# Patient Record
Sex: FEMALE | Race: WHITE | NOT HISPANIC OR LATINO
[De-identification: names, ages, dates, MRNs, and addresses within clinical notes are randomized per-mention and may not be internally consistent; named-entity substitution may affect disease eponyms.]

---

## 2021-03-05 PROBLEM — Z00.00 ENCOUNTER FOR PREVENTIVE HEALTH EXAMINATION: Status: ACTIVE | Noted: 2021-03-05

## 2021-03-30 PROBLEM — E78.5 HYPERLIPIDEMIA, UNSPECIFIED HYPERLIPIDEMIA TYPE: Status: ACTIVE | Noted: 2021-03-30

## 2021-03-30 PROBLEM — Z82.49 FAMILY HISTORY OF CORONARY ARTERY DISEASE: Status: ACTIVE | Noted: 2021-03-30

## 2021-03-30 PROBLEM — Z87.891 FORMER SMOKER: Status: ACTIVE | Noted: 2021-03-30

## 2021-03-30 PROBLEM — J45.909 ASTHMA, UNSPECIFIED ASTHMA SEVERITY, UNSPECIFIED WHETHER COMPLICATED, UNSPECIFIED WHETHER PERSISTENT: Status: ACTIVE | Noted: 2021-03-30

## 2021-03-30 PROBLEM — J44.9 CHRONIC OBSTRUCTIVE PULMONARY DISEASE, UNSPECIFIED COPD TYPE: Noted: 2021-03-30

## 2021-03-30 RX ORDER — DILTIAZEM HYDROCHLORIDE 300 MG/1
300 CAPSULE, EXTENDED RELEASE ORAL DAILY
Refills: 0 | Status: ACTIVE | COMMUNITY
Start: 2021-03-30

## 2021-03-30 RX ORDER — APIXABAN 5 MG/1
5 TABLET, FILM COATED ORAL
Qty: 60 | Refills: 1 | Status: ACTIVE | COMMUNITY
Start: 2021-03-30

## 2021-04-01 ENCOUNTER — APPOINTMENT (OUTPATIENT)
Dept: ELECTROPHYSIOLOGY | Facility: CLINIC | Age: 66
End: 2021-04-01
Payer: MEDICARE

## 2021-04-01 VITALS
WEIGHT: 247 LBS | TEMPERATURE: 96.7 F | SYSTOLIC BLOOD PRESSURE: 160 MMHG | BODY MASS INDEX: 35.36 KG/M2 | HEART RATE: 90 BPM | OXYGEN SATURATION: 98 % | DIASTOLIC BLOOD PRESSURE: 92 MMHG | HEIGHT: 70 IN

## 2021-04-01 DIAGNOSIS — J44.9 CHRONIC OBSTRUCTIVE PULMONARY DISEASE, UNSPECIFIED: ICD-10-CM

## 2021-04-01 DIAGNOSIS — Z87.891 PERSONAL HISTORY OF NICOTINE DEPENDENCE: ICD-10-CM

## 2021-04-01 DIAGNOSIS — J45.909 UNSPECIFIED ASTHMA, UNCOMPLICATED: ICD-10-CM

## 2021-04-01 DIAGNOSIS — E78.5 HYPERLIPIDEMIA, UNSPECIFIED: ICD-10-CM

## 2021-04-01 DIAGNOSIS — R06.83 SNORING: ICD-10-CM

## 2021-04-01 DIAGNOSIS — Z82.49 FAMILY HISTORY OF ISCHEMIC HEART DISEASE AND OTHER DISEASES OF THE CIRCULATORY SYSTEM: ICD-10-CM

## 2021-04-01 PROCEDURE — 99205 OFFICE O/P NEW HI 60 MIN: CPT | Mod: 25

## 2021-04-01 PROCEDURE — 93000 ELECTROCARDIOGRAM COMPLETE: CPT

## 2021-04-01 NOTE — REASON FOR VISIT
[Consultation] : a consultation regarding [Atrial Fibrillation] : atrial fibrillation [FreeTextEntry1] : Cardiologist: Dr. Damon

## 2021-04-01 NOTE — PHYSICAL EXAM
[General Appearance - Well Developed] : well developed [General Appearance - Well Nourished] : well nourished [FreeTextEntry1] : Obesity [Respiration, Rhythm And Depth] : normal respiratory rhythm and effort [Exaggerated Use Of Accessory Muscles For Inspiration] : no accessory muscle use [Abnormal Walk] : normal gait [Skin Color & Pigmentation] : normal skin color and pigmentation [Skin Turgor] : normal skin turgor [] : no rash [Oriented To Time, Place, And Person] : oriented to person, place, and time [Impaired Insight] : insight and judgment were intact [No Anxiety] : not feeling anxious

## 2021-04-01 NOTE — HISTORY OF PRESENT ILLNESS
[FreeTextEntry1] : KEMAR AUSTIN is a 65 year old female with hypertension, hyperlipidemia, and asthma who is referred by Dr. Damon in consultation for persistent atrial fibrillation.\par \par To summarize her history, she was first diagnosed with atrial fibrillation at age 54. She developed this in the setting of her mother being sick with lung cancer. She went to see her PCP and her AF had lasted for about 1 week. She saw a PCP who diagnosed her with AF on ECG. She was given Atenolol and she converted to sinus rhythm spontaneously. She was not started on anticoagulation. From 8300-2949 she reported that she was under significant stress. She had recurrence of AF in 2018 and she noted having palpitations and irregularity. At the time she was also having dyspnea on exertion. She was placed on warfarin and she underwent TRACY/cardioversion with restoration of normal rhythm. She maintained normal rhythm for about 10 months. It was around this time (3/2019) that her job had let her go to prison. Due to losing her job, she did not have healthcare coverage. She did not see any physician until 10/2020 and he recommended evaluation by cardiology. She was started on anticoagulation with Apixaban and so she feels that she was found to have AF. She saw Dr. Damon in 3/2021 and AF was confirmed.\par \par Today, she states that she does not have any dizziness, lightheadedness, palpitations or dyspnea. She reports that she has noticed less energy now as compared to when she did not have AF. She denies having any syncope, peripheral edema or PND. She endorses snoring.

## 2021-04-01 NOTE — DISCUSSION/SUMMARY
[FreeTextEntry1] : KEMAR AUSTIN is a 65 year old female with hypertension, hyperlipidemia, and asthma who is referred by Dr. Damon in consultation for persistent atrial fibrillation.\par \par We had a lengthy discussion about the diagnosis of atrial fibrillation, its prognosis and options for management (rate vs rhythm control), and the advantages and disadvantages of each. We also discussed the options for rhythm control with either medications, catheter ablation, or both. Since she has symptoms of fatigue despite rate control of AF and also some mitral regurgitation, I feel she would do best with maintenance of regular rhythm.\par \par The risks of catheter ablation were described in detail and include, but are not limited to: pain, bleeding, infection, vascular injury, cardiac perforation and tamponade with potential for requiring open heart surgery, complete heart block requiring permanent pacing, phrenic nerve injury and diaphragmatic paralysis, atrioesophageal fistula, pulmonary vein stenosis, radiation-induced injury, death, heart attack, or stroke, of which the overall rates of occurrence range from 0.1-4%. The patient expressed understanding and was provided the opportunity to ask questions, of which all were answered to the patient's satisfaction.\par \par At this juncture, the patient would like to pursue catheter ablation.\par \par In regards to anticoagulation, their CHADS2-VASc score is 3 (Age, HTN, Female) and so should remain on Apixaban.\par \par Lastly, I expressed the importance of risk factor modification for the long term maintenance of sinus rhythm including appropriate blood pressure control, diabetes control and weight management. She expressed understanding.\par \par Recommendations:\par - Sleep Study to evaluate for ADDY\par - Catheter ablation of AF\par - TRACY before ablation to rule out LA/PEPE thrombus\par - Continue Apixaban uninterrupted, hold morning of procedure\par - Needs better optimization of BP medications (patient to check BP at home)\par - Continue rate control for now

## 2021-04-20 ENCOUNTER — APPOINTMENT (OUTPATIENT)
Dept: DISASTER EMERGENCY | Facility: CLINIC | Age: 66
End: 2021-04-20

## 2021-04-20 DIAGNOSIS — Z01.818 ENCOUNTER FOR OTHER PREPROCEDURAL EXAMINATION: ICD-10-CM

## 2021-04-21 ENCOUNTER — NON-APPOINTMENT (OUTPATIENT)
Age: 66
End: 2021-04-21

## 2021-04-21 LAB — SARS-COV-2 N GENE NPH QL NAA+PROBE: NOT DETECTED

## 2021-04-22 ENCOUNTER — FORM ENCOUNTER (OUTPATIENT)
Age: 66
End: 2021-04-22

## 2021-04-22 VITALS
HEIGHT: 70 IN | DIASTOLIC BLOOD PRESSURE: 89 MMHG | OXYGEN SATURATION: 97 % | TEMPERATURE: 98 F | WEIGHT: 246.04 LBS | RESPIRATION RATE: 18 BRPM | SYSTOLIC BLOOD PRESSURE: 193 MMHG | HEART RATE: 108 BPM

## 2021-04-22 NOTE — H&P PST ADULT - HISTORY OF PRESENT ILLNESS
65 year old female with hypertension, hyperlipidemia, asthma and persistent atrial fibrillation. To summarize her history, she was first diagnosed with atrial fibrillation at age 54. She developed this in the setting of her mother being sick with lung cancer. She went to see her PCP and her AF had lasted for about 1 week. She saw a PCP who diagnosed her with AF on ECG. She was given Atenolol and she converted to sinus rhythm spontaneously. She was not started on anticoagulation. From 8595-9110 she reported that she was under significant stress. She had recurrence of AF in 2018 and she noted having palpitations and irregularity. At the time she was also having dyspnea on exertion. She was placed on warfarin and she underwent TRACY/cardioversion with restoration of normal rhythm. She maintained normal rhythm for about 10 months. It was around this time (3/2019) that her job had let her go to senior living. Due to losing her job, she did not have healthcare coverage. She did not see any physician until 10/2020 and he recommended evaluation by cardiology. She was started on anticoagulation with Apixaban and so she feels that she was found to have AF. She saw Dr. Damon in 3/2021 and AF was confirmed. She states that she does not have any dizziness, lightheadedness, palpitations or dyspnea. She reports that she has noticed less energy now as compared to when she did not have AF. She denies having any syncope, peripheral edema or PND. She presents electively for AF ablation with preceding TRACY.     Cardiology Summary  EK2021: AF rate controlled.  Echo: 03/10/2021 TTE: LVEF: 55%. Mildly dilated LA (LAd 4.71 cm, SUREKHA 52.3 mL/m2). Moderate-severe eccentric MR. Mild TR. RVSP 26.3 mmHg.  2018 TRACY: Normal LVEF. Mildly dilated LA. Normal RA. Mild MR, TR.  2018 TTE: LVEF: 64%. Mildly dilated LA (LAd 4.5 cm, SUREKHA 45.9 mL/m2). Mild MR, TR, PI.

## 2021-04-22 NOTE — H&P PST ADULT - COMMENTS
Denies fevers, chills, CP, palp, abdominal pain, N/V/D, hematuria, bloody or dark colored stools    + SOB/RODRIGUEZ + fatigue

## 2021-04-22 NOTE — H&P PST ADULT - NSICDXPASTMEDICALHX_GEN_ALL_CORE_FT
PAST MEDICAL HISTORY:  Asthma     [facility]    HLD (hyperlipidemia)     HTN (hypertension)

## 2021-04-22 NOTE — H&P PST ADULT - ATTENDING COMMENTS
65 year old woman with HTN, HL, asthma and symptomatic persistent atrial fibrillation (on Apixaban, TRACY/CV in 2018) despite rate control who now presents for AF ablation for rhythm control. All risks, benefits and alternatives discussed at length. She agrees to proceed with ablation. TRACY will be performed beforehand to rule out LA/PEPE thrombus.    Ceasar Huitron MD  Clinical Cardiac Electrophysiology

## 2021-04-22 NOTE — H&P PST ADULT - ASSESSMENT
65 year old female with hypertension, hyperlipidemia, asthma and persistent atrial fibrillation s/p TRACY/cardioversion in 2018 with restoration of normal rhythm. She maintained normal rhythm for about 10 months, however, she lost her healthcare coverage and was lost in followup and did not see any physician until 10/2020. She presents electively for AF ablation with preceding TRACY.     Plan:  Consent w/ Attending  Stat labs & ecg  Covid negative 4/20/2021  NPO ***  Last Xarelto dose *** 65 year old female with hypertension, hyperlipidemia, asthma and persistent atrial fibrillation s/p TRACY/cardioversion in 2018 with restoration of normal rhythm. She maintained normal rhythm for about 10 months, however, she lost her healthcare coverage and was lost in followup and did not see any physician until 10/2020. She presents electively for AF ablation with preceding TRACY.     Plan:  Consent w/ Attending  Stat labs & ecg  Covid negative 4/20/2021  NPO > 10 hours  Last Eliquis dose yesterday evening

## 2021-04-23 ENCOUNTER — TRANSCRIPTION ENCOUNTER (OUTPATIENT)
Age: 66
End: 2021-04-23

## 2021-04-23 ENCOUNTER — INPATIENT (INPATIENT)
Facility: HOSPITAL | Age: 66
LOS: 0 days | Discharge: HOME | DRG: 274 | End: 2021-04-24
Attending: INTERNAL MEDICINE | Admitting: INTERNAL MEDICINE
Payer: MEDICARE

## 2021-04-23 DIAGNOSIS — I48.19 OTHER PERSISTENT ATRIAL FIBRILLATION: ICD-10-CM

## 2021-04-23 DIAGNOSIS — I48.0 PAROXYSMAL ATRIAL FIBRILLATION: ICD-10-CM

## 2021-04-23 DIAGNOSIS — Z90.89 ACQUIRED ABSENCE OF OTHER ORGANS: Chronic | ICD-10-CM

## 2021-04-23 LAB
ABO RH CONFIRMATION: SIGNIFICANT CHANGE UP
ANION GAP SERPL CALC-SCNC: 11 MMOL/L — SIGNIFICANT CHANGE UP (ref 5–17)
APTT BLD: 33.8 SEC — SIGNIFICANT CHANGE UP (ref 27.5–35.5)
BLD GP AB SCN SERPL QL: SIGNIFICANT CHANGE UP
BUN SERPL-MCNC: 14 MG/DL — SIGNIFICANT CHANGE UP (ref 8–20)
CALCIUM SERPL-MCNC: 7.4 MG/DL — LOW (ref 8.6–10.2)
CHLORIDE SERPL-SCNC: 110 MMOL/L — HIGH (ref 98–107)
CO2 SERPL-SCNC: 22 MMOL/L — SIGNIFICANT CHANGE UP (ref 22–29)
CREAT SERPL-MCNC: 0.5 MG/DL — SIGNIFICANT CHANGE UP (ref 0.5–1.3)
GLUCOSE SERPL-MCNC: 109 MG/DL — HIGH (ref 70–99)
HCT VFR BLD CALC: 44.6 % — SIGNIFICANT CHANGE UP (ref 34.5–45)
HGB BLD-MCNC: 14.7 G/DL — SIGNIFICANT CHANGE UP (ref 11.5–15.5)
INR BLD: 1.06 RATIO — SIGNIFICANT CHANGE UP (ref 0.88–1.16)
MAGNESIUM SERPL-MCNC: 1.5 MG/DL — LOW (ref 1.6–2.6)
MCHC RBC-ENTMCNC: 29.9 PG — SIGNIFICANT CHANGE UP (ref 27–34)
MCHC RBC-ENTMCNC: 33 GM/DL — SIGNIFICANT CHANGE UP (ref 32–36)
MCV RBC AUTO: 90.7 FL — SIGNIFICANT CHANGE UP (ref 80–100)
PLATELET # BLD AUTO: 343 K/UL — SIGNIFICANT CHANGE UP (ref 150–400)
POTASSIUM SERPL-MCNC: 3.5 MMOL/L — SIGNIFICANT CHANGE UP (ref 3.5–5.3)
POTASSIUM SERPL-SCNC: 3.5 MMOL/L — SIGNIFICANT CHANGE UP (ref 3.5–5.3)
PROTHROM AB SERPL-ACNC: 12.3 SEC — SIGNIFICANT CHANGE UP (ref 10.6–13.6)
RBC # BLD: 4.92 M/UL — SIGNIFICANT CHANGE UP (ref 3.8–5.2)
RBC # FLD: 13.7 % — SIGNIFICANT CHANGE UP (ref 10.3–14.5)
SODIUM SERPL-SCNC: 142 MMOL/L — SIGNIFICANT CHANGE UP (ref 135–145)
WBC # BLD: 11.03 K/UL — HIGH (ref 3.8–10.5)
WBC # FLD AUTO: 11.03 K/UL — HIGH (ref 3.8–10.5)

## 2021-04-23 PROCEDURE — 93312 ECHO TRANSESOPHAGEAL: CPT | Mod: 26

## 2021-04-23 PROCEDURE — 93325 DOPPLER ECHO COLOR FLOW MAPG: CPT | Mod: 26

## 2021-04-23 PROCEDURE — 93320 DOPPLER ECHO COMPLETE: CPT | Mod: 26

## 2021-04-23 PROCEDURE — 76376 3D RENDER W/INTRP POSTPROCES: CPT | Mod: 26

## 2021-04-23 PROCEDURE — 93010 ELECTROCARDIOGRAM REPORT: CPT

## 2021-04-23 RX ORDER — FENTANYL CITRATE 50 UG/ML
25 INJECTION INTRAVENOUS ONCE
Refills: 0 | Status: DISCONTINUED | OUTPATIENT
Start: 2021-04-23 | End: 2021-04-23

## 2021-04-23 RX ORDER — ATORVASTATIN CALCIUM 80 MG/1
20 TABLET, FILM COATED ORAL AT BEDTIME
Refills: 0 | Status: DISCONTINUED | OUTPATIENT
Start: 2021-04-23 | End: 2021-04-24

## 2021-04-23 RX ORDER — ALBUTEROL 90 UG/1
2 AEROSOL, METERED ORAL EVERY 6 HOURS
Refills: 0 | Status: DISCONTINUED | OUTPATIENT
Start: 2021-04-23 | End: 2021-04-24

## 2021-04-23 RX ORDER — PANTOPRAZOLE SODIUM 20 MG/1
40 TABLET, DELAYED RELEASE ORAL
Refills: 0 | Status: DISCONTINUED | OUTPATIENT
Start: 2021-04-23 | End: 2021-04-24

## 2021-04-23 RX ORDER — SUCRALFATE 1 G
1 TABLET ORAL
Refills: 0 | Status: DISCONTINUED | OUTPATIENT
Start: 2021-04-23 | End: 2021-04-24

## 2021-04-23 RX ORDER — FUROSEMIDE 40 MG
20 TABLET ORAL DAILY
Refills: 0 | Status: DISCONTINUED | OUTPATIENT
Start: 2021-04-24 | End: 2021-04-24

## 2021-04-23 RX ORDER — ALBUTEROL 90 UG/1
2 AEROSOL, METERED ORAL
Qty: 0 | Refills: 0 | DISCHARGE

## 2021-04-23 RX ORDER — METOPROLOL TARTRATE 50 MG
100 TABLET ORAL DAILY
Refills: 0 | Status: DISCONTINUED | OUTPATIENT
Start: 2021-04-23 | End: 2021-04-24

## 2021-04-23 RX ORDER — APIXABAN 2.5 MG/1
1 TABLET, FILM COATED ORAL
Qty: 0 | Refills: 0 | DISCHARGE

## 2021-04-23 RX ORDER — BUDESONIDE AND FORMOTEROL FUMARATE DIHYDRATE 160; 4.5 UG/1; UG/1
2 AEROSOL RESPIRATORY (INHALATION)
Refills: 0 | Status: DISCONTINUED | OUTPATIENT
Start: 2021-04-23 | End: 2021-04-24

## 2021-04-23 RX ORDER — BUDESONIDE AND FORMOTEROL FUMARATE DIHYDRATE 160; 4.5 UG/1; UG/1
2 AEROSOL RESPIRATORY (INHALATION)
Qty: 0 | Refills: 0 | DISCHARGE

## 2021-04-23 RX ORDER — DILTIAZEM HCL 120 MG
300 CAPSULE, EXT RELEASE 24 HR ORAL DAILY
Refills: 0 | Status: DISCONTINUED | OUTPATIENT
Start: 2021-04-24 | End: 2021-04-24

## 2021-04-23 RX ORDER — BENZOCAINE AND MENTHOL 5; 1 G/100ML; G/100ML
1 LIQUID ORAL
Refills: 0 | Status: DISCONTINUED | OUTPATIENT
Start: 2021-04-23 | End: 2021-04-24

## 2021-04-23 RX ORDER — APIXABAN 2.5 MG/1
5 TABLET, FILM COATED ORAL
Refills: 0 | Status: DISCONTINUED | OUTPATIENT
Start: 2021-04-23 | End: 2021-04-24

## 2021-04-23 RX ORDER — MAGNESIUM SULFATE 500 MG/ML
2 VIAL (ML) INJECTION ONCE
Refills: 0 | Status: COMPLETED | OUTPATIENT
Start: 2021-04-23 | End: 2021-04-23

## 2021-04-23 RX ORDER — ALPRAZOLAM 0.25 MG
0.25 TABLET ORAL EVERY 8 HOURS
Refills: 0 | Status: DISCONTINUED | OUTPATIENT
Start: 2021-04-23 | End: 2021-04-24

## 2021-04-23 RX ORDER — DILTIAZEM HCL 120 MG
60 CAPSULE, EXT RELEASE 24 HR ORAL DAILY
Refills: 0 | Status: DISCONTINUED | OUTPATIENT
Start: 2021-04-23 | End: 2021-04-24

## 2021-04-23 RX ORDER — KETOROLAC TROMETHAMINE 30 MG/ML
15 SYRINGE (ML) INJECTION ONCE
Refills: 0 | Status: DISCONTINUED | OUTPATIENT
Start: 2021-04-23 | End: 2021-04-23

## 2021-04-23 RX ORDER — FUROSEMIDE 40 MG
20 TABLET ORAL ONCE
Refills: 0 | Status: COMPLETED | OUTPATIENT
Start: 2021-04-23 | End: 2021-04-23

## 2021-04-23 RX ORDER — DILTIAZEM HCL 120 MG
1 CAPSULE, EXT RELEASE 24 HR ORAL
Qty: 0 | Refills: 0 | DISCHARGE

## 2021-04-23 RX ORDER — ATORVASTATIN CALCIUM 80 MG/1
1 TABLET, FILM COATED ORAL
Qty: 0 | Refills: 0 | DISCHARGE

## 2021-04-23 RX ORDER — ACETAMINOPHEN 500 MG
650 TABLET ORAL EVERY 6 HOURS
Refills: 0 | Status: DISCONTINUED | OUTPATIENT
Start: 2021-04-23 | End: 2021-04-24

## 2021-04-23 RX ADMIN — APIXABAN 5 MILLIGRAM(S): 2.5 TABLET, FILM COATED ORAL at 18:32

## 2021-04-23 RX ADMIN — FENTANYL CITRATE 25 MICROGRAM(S): 50 INJECTION INTRAVENOUS at 15:45

## 2021-04-23 RX ADMIN — Medication 650 MILLIGRAM(S): at 22:15

## 2021-04-23 RX ADMIN — Medication 650 MILLIGRAM(S): at 21:15

## 2021-04-23 RX ADMIN — Medication 100 MILLIGRAM(S): at 18:35

## 2021-04-23 RX ADMIN — FENTANYL CITRATE 25 MICROGRAM(S): 50 INJECTION INTRAVENOUS at 15:05

## 2021-04-23 RX ADMIN — Medication 15 MILLIGRAM(S): at 17:30

## 2021-04-23 RX ADMIN — ATORVASTATIN CALCIUM 20 MILLIGRAM(S): 80 TABLET, FILM COATED ORAL at 21:12

## 2021-04-23 RX ADMIN — FENTANYL CITRATE 25 MICROGRAM(S): 50 INJECTION INTRAVENOUS at 14:51

## 2021-04-23 RX ADMIN — Medication 1 GRAM(S): at 18:35

## 2021-04-23 RX ADMIN — BENZOCAINE AND MENTHOL 1 LOZENGE: 5; 1 LIQUID ORAL at 21:13

## 2021-04-23 RX ADMIN — Medication 20 MILLIGRAM(S): at 18:36

## 2021-04-23 RX ADMIN — Medication 50 GRAM(S): at 15:31

## 2021-04-23 RX ADMIN — FENTANYL CITRATE 25 MICROGRAM(S): 50 INJECTION INTRAVENOUS at 15:30

## 2021-04-23 RX ADMIN — Medication 15 MILLIGRAM(S): at 17:00

## 2021-04-23 RX ADMIN — Medication 60 MILLIGRAM(S): at 18:32

## 2021-04-23 RX ADMIN — PANTOPRAZOLE SODIUM 40 MILLIGRAM(S): 20 TABLET, DELAYED RELEASE ORAL at 18:32

## 2021-04-23 NOTE — DISCHARGE NOTE PROVIDER - NSDCMRMEDTOKEN_GEN_ALL_CORE_FT
Albuterol (Eqv-ProAir HFA) 90 mcg/inh inhalation aerosol: 2 puff(s) inhaled every 6 hours  atorvastatin 20 mg oral tablet: 1 tab(s) orally once a day  Cartia  mg/24 hours oral capsule, extended release: 1 cap(s) orally once a day  dilTIAZem 60 mg/12 hours oral capsule, extended release: 1 cap(s) orally every 12 hours  Eliquis 5 mg oral tablet: 1 tab(s) orally 2 times a day  Metoprolol Succinate  mg oral tablet, extended release: 1 tab(s) orally once a day  Symbicort 160 mcg-4.5 mcg/inh inhalation aerosol: 2 puff(s) inhaled 2 times a day   Albuterol (Eqv-ProAir HFA) 90 mcg/inh inhalation aerosol: 2 puff(s) inhaled every 6 hours  atorvastatin 20 mg oral tablet: 1 tab(s) orally once a day  Cartia  mg/24 hours oral capsule, extended release: 1 cap(s) orally once a day  dilTIAZem 60 mg/12 hours oral capsule, extended release: 1 cap(s) orally every 12 hours  Eliquis 5 mg oral tablet: 1 tab(s) orally 2 times a day  furosemide 20 mg oral tablet: 1 tab(s) orally once a day x 3 days   Metoprolol Succinate  mg oral tablet, extended release: 1 tab(s) orally once a day  pantoprazole 40 mg oral delayed release tablet: 1 tab(s) orally 2 times a day x 14 days followed by 1 tabl orally daily x 30 days  sucralfate 1 g/10 mL oral suspension: 10 milliliter(s) orally 2 times a day x 14 days   Symbicort 160 mcg-4.5 mcg/inh inhalation aerosol: 2 puff(s) inhaled 2 times a day

## 2021-04-23 NOTE — PROGRESS NOTE ADULT - SUBJECTIVE AND OBJECTIVE BOX
Admission Criteria  Please admit the patient to the following service: CARDIOLOGY  Major Criteria:    - Continuous EKG monitoring is required for condition causing arrhythmia (hyperkalemia, etc)    - Significant volume load > 200 ml      Admit to: 3GUL     Patient is being admitted to the inpatient service due to high risk characteristics and need for further management/monitoring and is considered to be at a significantly increased risk of major adverse cardiac and vascular events if discharged.

## 2021-04-23 NOTE — PROGRESS NOTE ADULT - SUBJECTIVE AND OBJECTIVE BOX
PROCEDURE(S): Radiofrequency Ablation of Atrial Fibrillation and Atrial Flutter    ELECTROPHYSIOLOGIST(S): Ceasar Huitron MD         COMPLICATIONS:  none        DISPOSITION:  observation     CONDITION: stable      Pt doing well s/p atrial fibrillation (WACA/PVI, PW + CTI) ablation via b/l FV access. Hemostasis achieved via b/l figure 8 sutures. Complaining of b/l knee pain.       MEDICATIONS  (STANDING):  apixaban 5 milliGRAM(s) Oral two times a day  atorvastatin 20 milliGRAM(s) Oral at bedtime  budesonide 160 MICROgram(s)/formoterol 4.5 MICROgram(s) Inhaler 2 Puff(s) Inhalation two times a day  diltiazem    Tablet 60 milliGRAM(s) Oral daily  fentaNYL    Injectable 25 MICROGram(s) IV Push once  furosemide   Injectable 20 milliGRAM(s) IV Push once  magnesium sulfate  IVPB 2 Gram(s) IV Intermittent once  metoprolol succinate  milliGRAM(s) Oral daily  pantoprazole    Tablet 40 milliGRAM(s) Oral two times a day  sucralfate suspension 1 Gram(s) Oral two times a day    MEDICATIONS  (PRN):  acetaminophen   Tablet .. 650 milliGRAM(s) Oral every 6 hours PRN Mild Pain (1 - 3)  ALBUTerol    90 MICROgram(s) HFA Inhaler 2 Puff(s) Inhalation every 6 hours PRN Bronchospasm  ALPRAZolam 0.25 milliGRAM(s) Oral every 8 hours PRN anxiety/ insomnia  aluminum hydroxide/magnesium hydroxide/simethicone Suspension 30 milliLiter(s) Oral every 4 hours PRN Dyspepsia  benzocaine 15 mG/menthol 3.6 mG (Sugar-Free) Lozenge 1 Lozenge Oral every 2 hours PRN Sore Throat      Allergies  Allergy Status Unknown      Exam:   T(C): 36.7 (04-23-21 @ 07:48), Max: 36.7 (04-22-21 @ 19:12)  HR: 98 (04-23-21 @ 07:48) (98 - 108)  BP: 198/89 (04-23-21 @ 07:48) (193/89 - 198/89)  RR: 18 (04-23-21 @ 07:48) (18 - 18)  SpO2: 99% (04-23-21 @ 07:48) (97% - 99%)    Postprocedure vitals:  HR: 76  BP: 165/72  RR: 16  SpO2: 97% NRB    VSS, NAD, Sleepy from anesthesia   Card: S1/S2, RRR, no m/g/r  Resp: lungs CTA b/l  Abd: S/NT/ND  Groins: hemostatic sutures in place; sites C/D/I; no bleeding, hematoma, erythema, exudate or edema  Ext: 1+ b/l LE edema; distal pulses intact    I/Os: net + 2801    ECG:    Assessment:   65 year old female with hypertension, hyperlipidemia, asthma and persistent atrial fibrillation s/p TRACY/cardioversion in 2018 with restoration of normal rhythm. She maintained normal rhythm for about 10 months, however, she lost her healthcare coverage and was lost in followup and did not see any physician until 10/2020. She presents electively for AF ablation with preceding TRACY.  Now status post uncomplicated radiofrequency ablation of atrial fibrillation (WACA/PVI, PW + CTI) ablation via b/l FV access. Hemostasis achieved via b/l figure 8 sutures.      Plan:   Bedrest x 4 hours, then OOB with assistance and progress as tolerated.   Groin sutures to be removed by EP service in AM.   Radial art line to be removed once pt fully awake with stable vitals >1 hour post op.   Pending groin status: 5mg PO Eliquis to resume at 18:30 tonight.   Lasix 20mg IV x 1 dose once ambulating  Continue home medications.   Start Protonix 40mg twice daily x 2 weeks, then once daily x 6 weeks.   Carafate 1gm BID x 2 weeks.   Strict I/Os.  Please encourage incentive spirometry and ambulation once able.  Observation and monitoring on telemetry overnight with anticipated discharge in the AM and outpt follow up in 2-4 weeks.  PROCEDURE(S): Radiofrequency Ablation of Atrial Fibrillation and Atrial Flutter    ELECTROPHYSIOLOGIST(S): Ceasar Huitron MD         COMPLICATIONS:  none        DISPOSITION:  observation     CONDITION: stable      Pt doing well s/p atrial fibrillation (WACA/PVI, PW + CTI) ablation via b/l FV access. Hemostasis achieved via b/l figure 8 sutures. Complaining of b/l knee pain.       MEDICATIONS  (STANDING):  apixaban 5 milliGRAM(s) Oral two times a day  atorvastatin 20 milliGRAM(s) Oral at bedtime  budesonide 160 MICROgram(s)/formoterol 4.5 MICROgram(s) Inhaler 2 Puff(s) Inhalation two times a day  diltiazem    Tablet 60 milliGRAM(s) Oral daily  fentaNYL    Injectable 25 MICROGram(s) IV Push once  furosemide   Injectable 20 milliGRAM(s) IV Push once  magnesium sulfate  IVPB 2 Gram(s) IV Intermittent once  metoprolol succinate  milliGRAM(s) Oral daily  pantoprazole    Tablet 40 milliGRAM(s) Oral two times a day  sucralfate suspension 1 Gram(s) Oral two times a day    MEDICATIONS  (PRN):  acetaminophen   Tablet .. 650 milliGRAM(s) Oral every 6 hours PRN Mild Pain (1 - 3)  ALBUTerol    90 MICROgram(s) HFA Inhaler 2 Puff(s) Inhalation every 6 hours PRN Bronchospasm  ALPRAZolam 0.25 milliGRAM(s) Oral every 8 hours PRN anxiety/ insomnia  aluminum hydroxide/magnesium hydroxide/simethicone Suspension 30 milliLiter(s) Oral every 4 hours PRN Dyspepsia  benzocaine 15 mG/menthol 3.6 mG (Sugar-Free) Lozenge 1 Lozenge Oral every 2 hours PRN Sore Throat      Allergies  Allergy Status Unknown      Exam:   T(C): 36.7 (04-23-21 @ 07:48), Max: 36.7 (04-22-21 @ 19:12)  HR: 98 (04-23-21 @ 07:48) (98 - 108)  BP: 198/89 (04-23-21 @ 07:48) (193/89 - 198/89)  RR: 18 (04-23-21 @ 07:48) (18 - 18)  SpO2: 99% (04-23-21 @ 07:48) (97% - 99%)    Postprocedure vitals:  HR: 76  BP: 165/72  RR: 16  SpO2: 97% NRB    VSS, NAD, Sleepy from anesthesia   Card: S1/S2, RRR, no m/g/r  Resp: lungs CTA b/l  Abd: S/NT/ND  Groins: hemostatic sutures in place; sites C/D/I; no bleeding, hematoma, erythema, exudate or edema  Ext: 1+ b/l LE edema; distal pulses intact    I/Os: net + 2801    ECG: Sinus rhythm at 77bpm     Assessment:   65 year old female with hypertension, hyperlipidemia, asthma and persistent atrial fibrillation s/p TRACY/cardioversion in 2018 with restoration of normal rhythm. She maintained normal rhythm for about 10 months, however, she lost her healthcare coverage and was lost in followup and did not see any physician until 10/2020. She presents electively for AF ablation with preceding TRACY.  Now status post uncomplicated radiofrequency ablation of atrial fibrillation (WACA/PVI, PW + CTI) ablation via b/l FV access. Hemostasis achieved via b/l figure 8 sutures.      Plan:   Bedrest x 4 hours, then OOB with assistance and progress as tolerated.   Groin sutures to be removed by EP service in AM.   Radial art line to be removed once pt fully awake with stable vitals >1 hour post op.   Pending groin status: 5mg PO Eliquis to resume at 18:30 tonight.   Lasix 20mg IV x 1 dose once ambulating  Continue home medications.   Start Protonix 40mg twice daily x 2 weeks, then once daily x 6 weeks.   Carafate 1gm BID x 2 weeks.   Strict I/Os.  Please encourage incentive spirometry and ambulation once able.  Observation and monitoring on telemetry overnight with anticipated discharge in the AM and outpt follow up in 2-4 weeks.

## 2021-04-23 NOTE — DISCHARGE NOTE PROVIDER - HOSPITAL COURSE
65 year old female with hypertension, hyperlipidemia, asthma and persistent atrial fibrillation s/p TRACY/cardioversion in 2018 with restoration of normal rhythm. She maintained normal rhythm for about 10 months, however, she lost her healthcare coverage and was lost in followup and did not see any physician until 10/2020. She presents electively for AF ablation with preceding TRACY.  Now status post uncomplicated radiofrequency ablation of atrial fibrillation (WACA/PVI, PW + CTI) ablation via b/l FV access.

## 2021-04-23 NOTE — DISCHARGE NOTE PROVIDER - NSDCFUADDINST_GEN_ALL_CORE_FT
Our office will contact you in 3-5 days to schedule this appointment. Please call [phone] with questions or concerns.

## 2021-04-23 NOTE — DISCHARGE NOTE PROVIDER - CARE PROVIDER_API CALL
Ceasar Huitron)  Cardiology; Internal Medicine  [address] [place] [address], [place] [address]  Phone: [phone]  Fax: [phone]  Follow Up Time:

## 2021-04-24 VITALS — DIASTOLIC BLOOD PRESSURE: 77 MMHG | HEART RATE: 78 BPM | SYSTOLIC BLOOD PRESSURE: 178 MMHG | RESPIRATION RATE: 16 BRPM

## 2021-04-24 LAB
ANION GAP SERPL CALC-SCNC: 13 MMOL/L — SIGNIFICANT CHANGE UP (ref 5–17)
BUN SERPL-MCNC: 15 MG/DL — SIGNIFICANT CHANGE UP (ref 8–20)
CALCIUM SERPL-MCNC: 9.1 MG/DL — SIGNIFICANT CHANGE UP (ref 8.6–10.2)
CHLORIDE SERPL-SCNC: 100 MMOL/L — SIGNIFICANT CHANGE UP (ref 98–107)
CO2 SERPL-SCNC: 23 MMOL/L — SIGNIFICANT CHANGE UP (ref 22–29)
CREAT SERPL-MCNC: 0.72 MG/DL — SIGNIFICANT CHANGE UP (ref 0.5–1.3)
GLUCOSE SERPL-MCNC: 134 MG/DL — HIGH (ref 70–99)
HCT VFR BLD CALC: 39.6 % — SIGNIFICANT CHANGE UP (ref 34.5–45)
HGB BLD-MCNC: 13.1 G/DL — SIGNIFICANT CHANGE UP (ref 11.5–15.5)
MAGNESIUM SERPL-MCNC: 1.9 MG/DL — SIGNIFICANT CHANGE UP (ref 1.6–2.6)
MCHC RBC-ENTMCNC: 29.7 PG — SIGNIFICANT CHANGE UP (ref 27–34)
MCHC RBC-ENTMCNC: 33.1 GM/DL — SIGNIFICANT CHANGE UP (ref 32–36)
MCV RBC AUTO: 89.8 FL — SIGNIFICANT CHANGE UP (ref 80–100)
PLATELET # BLD AUTO: 280 K/UL — SIGNIFICANT CHANGE UP (ref 150–400)
POTASSIUM SERPL-MCNC: 3.9 MMOL/L — SIGNIFICANT CHANGE UP (ref 3.5–5.3)
POTASSIUM SERPL-SCNC: 3.9 MMOL/L — SIGNIFICANT CHANGE UP (ref 3.5–5.3)
RBC # BLD: 4.41 M/UL — SIGNIFICANT CHANGE UP (ref 3.8–5.2)
RBC # FLD: 14.1 % — SIGNIFICANT CHANGE UP (ref 10.3–14.5)
SODIUM SERPL-SCNC: 136 MMOL/L — SIGNIFICANT CHANGE UP (ref 135–145)
WBC # BLD: 17.65 K/UL — HIGH (ref 3.8–10.5)
WBC # FLD AUTO: 17.65 K/UL — HIGH (ref 3.8–10.5)

## 2021-04-24 PROCEDURE — 93010 ELECTROCARDIOGRAM REPORT: CPT

## 2021-04-24 PROCEDURE — 93005 ELECTROCARDIOGRAM TRACING: CPT

## 2021-04-24 PROCEDURE — 94640 AIRWAY INHALATION TREATMENT: CPT

## 2021-04-24 PROCEDURE — 86850 RBC ANTIBODY SCREEN: CPT

## 2021-04-24 PROCEDURE — 86900 BLOOD TYPING SEROLOGIC ABO: CPT

## 2021-04-24 PROCEDURE — 80048 BASIC METABOLIC PNL TOTAL CA: CPT

## 2021-04-24 PROCEDURE — 86901 BLOOD TYPING SEROLOGIC RH(D): CPT

## 2021-04-24 PROCEDURE — 83735 ASSAY OF MAGNESIUM: CPT

## 2021-04-24 PROCEDURE — 85610 PROTHROMBIN TIME: CPT

## 2021-04-24 PROCEDURE — 93325 DOPPLER ECHO COLOR FLOW MAPG: CPT

## 2021-04-24 PROCEDURE — 36415 COLL VENOUS BLD VENIPUNCTURE: CPT

## 2021-04-24 PROCEDURE — 85730 THROMBOPLASTIN TIME PARTIAL: CPT

## 2021-04-24 PROCEDURE — 85027 COMPLETE CBC AUTOMATED: CPT

## 2021-04-24 PROCEDURE — 93312 ECHO TRANSESOPHAGEAL: CPT

## 2021-04-24 PROCEDURE — 93320 DOPPLER ECHO COMPLETE: CPT

## 2021-04-24 RX ORDER — SUCRALFATE 1 G
10 TABLET ORAL
Qty: 280 | Refills: 0
Start: 2021-04-24 | End: 2021-05-07

## 2021-04-24 RX ORDER — MAGNESIUM SULFATE 500 MG/ML
1 VIAL (ML) INJECTION ONCE
Refills: 0 | Status: COMPLETED | OUTPATIENT
Start: 2021-04-24 | End: 2021-04-24

## 2021-04-24 RX ORDER — FUROSEMIDE 40 MG
1 TABLET ORAL
Qty: 3 | Refills: 0
Start: 2021-04-24 | End: 2021-04-26

## 2021-04-24 RX ORDER — PANTOPRAZOLE SODIUM 20 MG/1
1 TABLET, DELAYED RELEASE ORAL
Qty: 58 | Refills: 0
Start: 2021-04-24 | End: 2021-05-07

## 2021-04-24 RX ADMIN — Medication 1 GRAM(S): at 06:09

## 2021-04-24 RX ADMIN — PANTOPRAZOLE SODIUM 40 MILLIGRAM(S): 20 TABLET, DELAYED RELEASE ORAL at 06:08

## 2021-04-24 RX ADMIN — Medication 650 MILLIGRAM(S): at 03:44

## 2021-04-24 RX ADMIN — Medication 100 GRAM(S): at 06:09

## 2021-04-24 RX ADMIN — Medication 20 MILLIGRAM(S): at 06:08

## 2021-04-24 RX ADMIN — Medication 650 MILLIGRAM(S): at 10:25

## 2021-04-24 RX ADMIN — Medication 300 MILLIGRAM(S): at 07:02

## 2021-04-24 RX ADMIN — BENZOCAINE AND MENTHOL 1 LOZENGE: 5; 1 LIQUID ORAL at 03:44

## 2021-04-24 RX ADMIN — Medication 650 MILLIGRAM(S): at 04:14

## 2021-04-24 RX ADMIN — BUDESONIDE AND FORMOTEROL FUMARATE DIHYDRATE 2 PUFF(S): 160; 4.5 AEROSOL RESPIRATORY (INHALATION) at 07:03

## 2021-04-24 RX ADMIN — Medication 100 MILLIGRAM(S): at 06:08

## 2021-04-24 RX ADMIN — APIXABAN 5 MILLIGRAM(S): 2.5 TABLET, FILM COATED ORAL at 06:08

## 2021-04-24 RX ADMIN — Medication 650 MILLIGRAM(S): at 12:11

## 2021-04-24 NOTE — ASU DISCHARGE PLAN (ADULT/PEDIATRIC) - ASU DC SPECIAL INSTRUCTIONSFT
No heavy lifting, bearing down, no heavy activity, heavy sports.  No driving for 24 hours. Monitor site for signs of infection including, redness, puss, drainage, temp, if noted notify MD.  Monitor site for swelling and bleeding.  If noted apply pressure for 10 min. if does not stop call 911. May shower in 24 hours, wash area gently with soap and water, pat dry, and leave open to air. Showers only, to tub bathing, swimming pools or hot tubs.  Do not apply any creams, lotions, or powders to site.

## 2021-04-24 NOTE — PROGRESS NOTE ADULT - SUBJECTIVE AND OBJECTIVE BOX
Patient seen today in bed. No overnight complaints. Figure 8 sutures removed from right and left groin without complication. Magnesium supplemented     EKG: SR at 89bpm; qRSD 102ms; QT 360ms; .   TELE: SR with occasional APCs    MEDICATIONS  (STANDING):  apixaban 5 milliGRAM(s) Oral two times a day  atorvastatin 20 milliGRAM(s) Oral at bedtime  budesonide 160 MICROgram(s)/formoterol 4.5 MICROgram(s) Inhaler 2 Puff(s) Inhalation two times a day  diltiazem    Tablet 60 milliGRAM(s) Oral daily  diltiazem    milliGRAM(s) Oral daily  furosemide    Tablet 20 milliGRAM(s) Oral daily  metoprolol succinate  milliGRAM(s) Oral daily  pantoprazole    Tablet 40 milliGRAM(s) Oral two times a day  sucralfate suspension 1 Gram(s) Oral two times a day    MEDICATIONS  (PRN):  acetaminophen   Tablet .. 650 milliGRAM(s) Oral every 6 hours PRN Mild Pain (1 - 3)  ALBUTerol    90 MICROgram(s) HFA Inhaler 2 Puff(s) Inhalation every 6 hours PRN Bronchospasm  ALPRAZolam 0.25 milliGRAM(s) Oral every 8 hours PRN anxiety/ insomnia  aluminum hydroxide/magnesium hydroxide/simethicone Suspension 30 milliLiter(s) Oral every 4 hours PRN Dyspepsia  benzocaine 15 mG/menthol 3.6 mG (Sugar-Free) Lozenge 1 Lozenge Oral every 2 hours PRN Sore Throat    Allergies  penicillin (Hives)    PAST MEDICAL & SURGICAL HISTORY:  Atrial fibrillation  h/o [facility] 2018  Asthma  HLD (hyperlipidemia)  HTN (hypertension)  History of tonsillectomy    Vital Signs Last 24 Hrs  T(C): 37.2 (24 Apr 2021 05:56), Max: 37.2 (24 Apr 2021 05:56)  T(F): 98.9 (24 Apr 2021 05:56), Max: 98.9 (24 Apr 2021 05:56)  HR: 89 (24 Apr 2021 05:56) (76 - 89)  BP: 189/83 (24 Apr 2021 05:56) (123/60 - 189/83)  RR: 17 (24 Apr 2021 05:56) (12 - 20)  SpO2: 98% (24 Apr 2021 05:56) (95% - 100%)    Physical Exam:  Constitutional: NAD, AAOx3  Cardiovascular: +S1S2 RRR  Pulmonary: CTA b/l, unlabored  GI: soft NTND +BS  Extremities: no pedal edema  Right and left groin: No hematoma right or left groin. No ecchymosis noted.   Neuro: non focal, BURKS x4    LABS:                        14.7   11.03 )-----------( 343      ( 23 Apr 2021 07:48 )             44.6     136  |  100  |  15.0  ----------------------------<  134<H>  3.9   |  23.0  |  0.72  Ca    9.1      24 Apr 2021 04:18  Mg     1.9     04-24  PT/INR - ( 23 Apr 2021 07:48 )   PT: 12.3 sec;   INR: 1.06 ratio    PTT - ( 23 Apr 2021 07:48 )  PTT:33.8 sec    A/P  65 year old female with hypertension, hyperlipidemia, asthma and persistent atrial fibrillation s/p TRACY/cardioversion in 2018 who is now status post uncomplicated radiofrequency ablation of atrial fibrillation (WACA/PVI, PW + CTI) ablation via b/l FV access.      - Discharge home today once CBC resulted.

## 2021-04-27 PROBLEM — E78.5 HYPERLIPIDEMIA, UNSPECIFIED: Chronic | Status: ACTIVE | Noted: 2021-04-22

## 2021-04-27 PROBLEM — I10 ESSENTIAL (PRIMARY) HYPERTENSION: Chronic | Status: ACTIVE | Noted: 2021-04-22

## 2021-04-27 PROBLEM — I48.91 UNSPECIFIED ATRIAL FIBRILLATION: Chronic | Status: ACTIVE | Noted: 2021-04-22

## 2021-04-27 PROBLEM — J45.909 UNSPECIFIED ASTHMA, UNCOMPLICATED: Chronic | Status: ACTIVE | Noted: 2021-04-22

## 2021-04-29 ENCOUNTER — INPATIENT (INPATIENT)
Facility: HOSPITAL | Age: 66
LOS: 0 days | Discharge: HOME | DRG: 372 | End: 2021-04-30
Attending: INTERNAL MEDICINE | Admitting: EMERGENCY MEDICINE
Payer: MEDICARE

## 2021-04-29 VITALS
HEIGHT: 70 IN | RESPIRATION RATE: 20 BRPM | OXYGEN SATURATION: 98 % | WEIGHT: 242.95 LBS | DIASTOLIC BLOOD PRESSURE: 88 MMHG | TEMPERATURE: 98 F | SYSTOLIC BLOOD PRESSURE: 166 MMHG | HEART RATE: 128 BPM

## 2021-04-29 DIAGNOSIS — Z90.89 ACQUIRED ABSENCE OF OTHER ORGANS: Chronic | ICD-10-CM

## 2021-04-29 DIAGNOSIS — E78.5 HYPERLIPIDEMIA, UNSPECIFIED: ICD-10-CM

## 2021-04-29 DIAGNOSIS — A04.72 ENTEROCOLITIS DUE TO CLOSTRIDIUM DIFFICILE, NOT SPECIFIED AS RECURRENT: ICD-10-CM

## 2021-04-29 DIAGNOSIS — I48.91 UNSPECIFIED ATRIAL FIBRILLATION: ICD-10-CM

## 2021-04-29 DIAGNOSIS — Z87.09 PERSONAL HISTORY OF OTHER DISEASES OF THE RESPIRATORY SYSTEM: ICD-10-CM

## 2021-04-29 LAB
ALBUMIN SERPL ELPH-MCNC: 3.8 G/DL — SIGNIFICANT CHANGE UP (ref 3.3–5.2)
ALP SERPL-CCNC: 106 U/L — SIGNIFICANT CHANGE UP (ref 40–120)
ALT FLD-CCNC: 20 U/L — SIGNIFICANT CHANGE UP
ANION GAP SERPL CALC-SCNC: 16 MMOL/L — SIGNIFICANT CHANGE UP (ref 5–17)
AST SERPL-CCNC: 17 U/L — SIGNIFICANT CHANGE UP
BASOPHILS # BLD AUTO: 0.04 K/UL — SIGNIFICANT CHANGE UP (ref 0–0.2)
BASOPHILS NFR BLD AUTO: 0.3 % — SIGNIFICANT CHANGE UP (ref 0–2)
BILIRUB SERPL-MCNC: 0.5 MG/DL — SIGNIFICANT CHANGE UP (ref 0.4–2)
BUN SERPL-MCNC: 14 MG/DL — SIGNIFICANT CHANGE UP (ref 8–20)
C DIFF BY PCR RESULT: DETECTED
C DIFF TOX GENS STL QL NAA+PROBE: SIGNIFICANT CHANGE UP
CALCIUM SERPL-MCNC: 9.8 MG/DL — SIGNIFICANT CHANGE UP (ref 8.6–10.2)
CHLORIDE SERPL-SCNC: 101 MMOL/L — SIGNIFICANT CHANGE UP (ref 98–107)
CO2 SERPL-SCNC: 23 MMOL/L — SIGNIFICANT CHANGE UP (ref 22–29)
CREAT SERPL-MCNC: 0.69 MG/DL — SIGNIFICANT CHANGE UP (ref 0.5–1.3)
CULTURE RESULTS: SIGNIFICANT CHANGE UP
EOSINOPHIL # BLD AUTO: 0.08 K/UL — SIGNIFICANT CHANGE UP (ref 0–0.5)
EOSINOPHIL NFR BLD AUTO: 0.6 % — SIGNIFICANT CHANGE UP (ref 0–6)
GLUCOSE SERPL-MCNC: 124 MG/DL — HIGH (ref 70–99)
HCT VFR BLD CALC: 44 % — SIGNIFICANT CHANGE UP (ref 34.5–45)
HGB BLD-MCNC: 14.5 G/DL — SIGNIFICANT CHANGE UP (ref 11.5–15.5)
IMM GRANULOCYTES NFR BLD AUTO: 0.4 % — SIGNIFICANT CHANGE UP (ref 0–1.5)
LACTATE BLDV-MCNC: 1.1 MMOL/L — SIGNIFICANT CHANGE UP (ref 0.5–2)
LYMPHOCYTES # BLD AUTO: 0.76 K/UL — LOW (ref 1–3.3)
LYMPHOCYTES # BLD AUTO: 5.6 % — LOW (ref 13–44)
MAGNESIUM SERPL-MCNC: 1.8 MG/DL — SIGNIFICANT CHANGE UP (ref 1.6–2.6)
MCHC RBC-ENTMCNC: 30 PG — SIGNIFICANT CHANGE UP (ref 27–34)
MCHC RBC-ENTMCNC: 33 GM/DL — SIGNIFICANT CHANGE UP (ref 32–36)
MCV RBC AUTO: 90.9 FL — SIGNIFICANT CHANGE UP (ref 80–100)
MONOCYTES # BLD AUTO: 0.62 K/UL — SIGNIFICANT CHANGE UP (ref 0–0.9)
MONOCYTES NFR BLD AUTO: 4.6 % — SIGNIFICANT CHANGE UP (ref 2–14)
NEUTROPHILS # BLD AUTO: 12.04 K/UL — HIGH (ref 1.8–7.4)
NEUTROPHILS NFR BLD AUTO: 88.5 % — HIGH (ref 43–77)
PLATELET # BLD AUTO: 340 K/UL — SIGNIFICANT CHANGE UP (ref 150–400)
POTASSIUM SERPL-MCNC: 3.5 MMOL/L — SIGNIFICANT CHANGE UP (ref 3.5–5.3)
POTASSIUM SERPL-SCNC: 3.5 MMOL/L — SIGNIFICANT CHANGE UP (ref 3.5–5.3)
PROT SERPL-MCNC: 7 G/DL — SIGNIFICANT CHANGE UP (ref 6.6–8.7)
RBC # BLD: 4.84 M/UL — SIGNIFICANT CHANGE UP (ref 3.8–5.2)
RBC # FLD: 13.2 % — SIGNIFICANT CHANGE UP (ref 10.3–14.5)
SARS-COV-2 RNA SPEC QL NAA+PROBE: SIGNIFICANT CHANGE UP
SODIUM SERPL-SCNC: 140 MMOL/L — SIGNIFICANT CHANGE UP (ref 135–145)
SPECIMEN SOURCE: SIGNIFICANT CHANGE UP
TROPONIN T SERPL-MCNC: 0.06 NG/ML — SIGNIFICANT CHANGE UP (ref 0–0.06)
WBC # BLD: 13.59 K/UL — HIGH (ref 3.8–10.5)
WBC # FLD AUTO: 13.59 K/UL — HIGH (ref 3.8–10.5)

## 2021-04-29 PROCEDURE — 99285 EMERGENCY DEPT VISIT HI MDM: CPT | Mod: CS,25,GC

## 2021-04-29 PROCEDURE — 99156 MOD SED OTH PHYS/QHP 5/>YRS: CPT

## 2021-04-29 PROCEDURE — 74174 CTA ABD&PLVS W/CONTRAST: CPT | Mod: 26,MA

## 2021-04-29 PROCEDURE — 99223 1ST HOSP IP/OBS HIGH 75: CPT

## 2021-04-29 RX ORDER — ALBUTEROL 90 UG/1
2 AEROSOL, METERED ORAL EVERY 6 HOURS
Refills: 0 | Status: DISCONTINUED | OUTPATIENT
Start: 2021-04-29 | End: 2021-04-30

## 2021-04-29 RX ORDER — AMIODARONE HYDROCHLORIDE 400 MG/1
1 TABLET ORAL
Qty: 900 | Refills: 0 | Status: DISCONTINUED | OUTPATIENT
Start: 2021-04-29 | End: 2021-04-30

## 2021-04-29 RX ORDER — SODIUM CHLORIDE 9 MG/ML
1000 INJECTION INTRAMUSCULAR; INTRAVENOUS; SUBCUTANEOUS
Refills: 0 | Status: DISCONTINUED | OUTPATIENT
Start: 2021-04-29 | End: 2021-04-29

## 2021-04-29 RX ORDER — VANCOMYCIN HCL 1 G
125 VIAL (EA) INTRAVENOUS EVERY 6 HOURS
Refills: 0 | Status: DISCONTINUED | OUTPATIENT
Start: 2021-04-29 | End: 2021-04-30

## 2021-04-29 RX ORDER — AMIODARONE HYDROCHLORIDE 400 MG/1
400 TABLET ORAL EVERY 12 HOURS
Refills: 0 | Status: DISCONTINUED | OUTPATIENT
Start: 2021-04-29 | End: 2021-04-30

## 2021-04-29 RX ORDER — POTASSIUM CHLORIDE 20 MEQ
10 PACKET (EA) ORAL ONCE
Refills: 0 | Status: COMPLETED | OUTPATIENT
Start: 2021-04-29 | End: 2021-04-29

## 2021-04-29 RX ORDER — PANTOPRAZOLE SODIUM 20 MG/1
40 TABLET, DELAYED RELEASE ORAL
Refills: 0 | Status: DISCONTINUED | OUTPATIENT
Start: 2021-04-29 | End: 2021-04-30

## 2021-04-29 RX ORDER — SODIUM CHLORIDE 9 MG/ML
1000 INJECTION INTRAMUSCULAR; INTRAVENOUS; SUBCUTANEOUS ONCE
Refills: 0 | Status: COMPLETED | OUTPATIENT
Start: 2021-04-29 | End: 2021-04-29

## 2021-04-29 RX ORDER — AMIODARONE HYDROCHLORIDE 400 MG/1
0.5 TABLET ORAL
Qty: 900 | Refills: 0 | Status: COMPLETED | OUTPATIENT
Start: 2021-04-30 | End: 2021-04-30

## 2021-04-29 RX ORDER — AMIODARONE HYDROCHLORIDE 400 MG/1
150 TABLET ORAL ONCE
Refills: 0 | Status: DISCONTINUED | OUTPATIENT
Start: 2021-04-29 | End: 2021-04-29

## 2021-04-29 RX ORDER — ATORVASTATIN CALCIUM 80 MG/1
20 TABLET, FILM COATED ORAL AT BEDTIME
Refills: 0 | Status: DISCONTINUED | OUTPATIENT
Start: 2021-04-29 | End: 2021-04-30

## 2021-04-29 RX ORDER — BUDESONIDE AND FORMOTEROL FUMARATE DIHYDRATE 160; 4.5 UG/1; UG/1
2 AEROSOL RESPIRATORY (INHALATION)
Refills: 0 | Status: DISCONTINUED | OUTPATIENT
Start: 2021-04-29 | End: 2021-04-30

## 2021-04-29 RX ORDER — SODIUM CHLORIDE 9 MG/ML
1000 INJECTION INTRAMUSCULAR; INTRAVENOUS; SUBCUTANEOUS
Refills: 0 | Status: DISCONTINUED | OUTPATIENT
Start: 2021-04-29 | End: 2021-04-30

## 2021-04-29 RX ORDER — APIXABAN 2.5 MG/1
5 TABLET, FILM COATED ORAL EVERY 12 HOURS
Refills: 0 | Status: DISCONTINUED | OUTPATIENT
Start: 2021-04-29 | End: 2021-04-30

## 2021-04-29 RX ORDER — MAGNESIUM SULFATE 500 MG/ML
1 VIAL (ML) INJECTION ONCE
Refills: 0 | Status: COMPLETED | OUTPATIENT
Start: 2021-04-29 | End: 2021-04-29

## 2021-04-29 RX ORDER — SUCRALFATE 1 G
1 TABLET ORAL
Refills: 0 | Status: DISCONTINUED | OUTPATIENT
Start: 2021-04-29 | End: 2021-04-30

## 2021-04-29 RX ORDER — PANTOPRAZOLE SODIUM 20 MG/1
40 TABLET, DELAYED RELEASE ORAL DAILY
Refills: 0 | Status: DISCONTINUED | OUTPATIENT
Start: 2021-04-29 | End: 2021-04-29

## 2021-04-29 RX ORDER — AMIODARONE HYDROCHLORIDE 400 MG/1
150 TABLET ORAL ONCE
Refills: 0 | Status: COMPLETED | OUTPATIENT
Start: 2021-04-29 | End: 2021-04-29

## 2021-04-29 RX ORDER — AMIODARONE HYDROCHLORIDE 400 MG/1
200 TABLET ORAL DAILY
Refills: 0 | Status: CANCELLED | OUTPATIENT
Start: 2021-05-06 | End: 2021-04-30

## 2021-04-29 RX ORDER — AMIODARONE HYDROCHLORIDE 400 MG/1
TABLET ORAL
Refills: 0 | Status: DISCONTINUED | OUTPATIENT
Start: 2021-04-29 | End: 2021-04-30

## 2021-04-29 RX ADMIN — AMIODARONE HYDROCHLORIDE 400 MILLIGRAM(S): 400 TABLET ORAL at 18:15

## 2021-04-29 RX ADMIN — AMIODARONE HYDROCHLORIDE 33.3 MG/MIN: 400 TABLET ORAL at 18:15

## 2021-04-29 RX ADMIN — Medication 100 GRAM(S): at 18:15

## 2021-04-29 RX ADMIN — SODIUM CHLORIDE 1000 MILLILITER(S): 9 INJECTION INTRAMUSCULAR; INTRAVENOUS; SUBCUTANEOUS at 18:15

## 2021-04-29 RX ADMIN — BUDESONIDE AND FORMOTEROL FUMARATE DIHYDRATE 2 PUFF(S): 160; 4.5 AEROSOL RESPIRATORY (INHALATION) at 21:02

## 2021-04-29 RX ADMIN — APIXABAN 5 MILLIGRAM(S): 2.5 TABLET, FILM COATED ORAL at 18:15

## 2021-04-29 RX ADMIN — AMIODARONE HYDROCHLORIDE 618 MILLIGRAM(S): 400 TABLET ORAL at 15:16

## 2021-04-29 RX ADMIN — Medication 125 MILLIGRAM(S): at 23:14

## 2021-04-29 RX ADMIN — Medication 125 MILLIGRAM(S): at 18:16

## 2021-04-29 RX ADMIN — SODIUM CHLORIDE 125 MILLILITER(S): 9 INJECTION INTRAMUSCULAR; INTRAVENOUS; SUBCUTANEOUS at 19:44

## 2021-04-29 RX ADMIN — Medication 10 MILLIEQUIVALENT(S): at 18:16

## 2021-04-29 RX ADMIN — ATORVASTATIN CALCIUM 20 MILLIGRAM(S): 80 TABLET, FILM COATED ORAL at 23:13

## 2021-04-29 RX ADMIN — Medication 125 MILLIGRAM(S): at 14:09

## 2021-04-29 RX ADMIN — SODIUM CHLORIDE 2000 MILLILITER(S): 9 INJECTION INTRAMUSCULAR; INTRAVENOUS; SUBCUTANEOUS at 08:36

## 2021-04-29 NOTE — CHART NOTE - NSCHARTNOTEFT_GEN_A_CORE
Brief EP Update Note:    Patient remains in AF. She ate this evening so cannot pursue cardioversion. She can eat this evening but we will plan for cardioversion tomorrow. She does not need a TRACY as she has not missed any doses of anticoagulation. We will give concomitant IV load in hopes that it may facilitate conversion to sinus rhythm.    Ceasar Huitron MD  Clinical Cardiac Electrophysiology

## 2021-04-29 NOTE — ED ADULT NURSE REASSESSMENT NOTE - NS ED NURSE REASSESS COMMENT FT1
pt a&ox3, a fib on cardiac monitor. denies any pain/discomfort. + BMs. contact prec remain in place. pending ct results. updated on plan of care, verbalize understanding. call bell in reach

## 2021-04-29 NOTE — ED PROVIDER NOTE - CARE PLAN
Principal Discharge DX:	Atrial fibrillation   Principal Discharge DX:	Atrial fibrillation  Secondary Diagnosis:	Clostridium difficile diarrhea

## 2021-04-29 NOTE — ED ADULT NURSE NOTE - NSIMPLEMENTINTERV_GEN_ALL_ED
Implemented All Universal Safety Interventions:  [place] to call system. Call bell, personal items and telephone within reach. Instruct patient to call for assistance. Room bathroom lighting operational. Non-slip footwear when patient is off stretcher. Physically safe environment: no spills, clutter or unnecessary equipment. Stretcher in lowest position, wheels locked, appropriate side rails in place.

## 2021-04-29 NOTE — ED ADULT NURSE REASSESSMENT NOTE - NS ED NURSE REASSESS COMMENT FT1
c diff +, contact prec remain in place, education provided, + teach back method. iv fluids complete. ct complete pending results. pending further tx plan. updated on plan of care, verbalize understanding. call bell in reach

## 2021-04-29 NOTE — ED ADULT NURSE REASSESSMENT NOTE - NS ED NURSE REASSESS COMMENT FT1
pt remains a&ox3 denies any pain/discomfort. diarrhea continues. tolerating po. afebrile. meds as rxd. pending dispo. updated on plan of care, verbalize understanding call bell in reach

## 2021-04-29 NOTE — CONSULT NOTE ADULT - ASSESSMENT
Merle Rodarte is a 65 year old woman with HTN, HL, asthma and symptomatic persistent AF (on Apixaban, TRACY/CV in 2018) who is status post recent catheter ablation of AF on 4/23/21 (PVI, PWI, CTI line) who returns with significant watery diarrhea found to be C. Diff positive and also with AF with RVR. CTA demonstrated no evidence of ischemic bowel.    Her AF recurrence is likely due to stress from C. Diff infection. She fortunately does not appear toxic at this time. She did not receive any antibiotics recently so it is unclear what the source of her C. Diff infection is. Agree with treatment of her C. Diff.    In regards to her AF, I would recommend starting amiodarone load with 150mg IV x1 now followed by 400mg PO BID x14 days and 200mg daily thereafter to help maintain normal rhythm during the blanking period. If she does not convert to normal rhythm with amiodarone, then we can consider performing cardioversion (provided patient has remained NPO). She MUST CONTINUE APIXABAN uninterrupted to minimize risk of stroke in the victor manuel-ablation period.    Atrial Fibrillation with RVR  S/p AF ablation  C. Diff Associated Diarrhea    Recommendations:  - Keep patient NPO  - IV Amiodarone 150mg x1  - PO Amiodarone 400mg BID x14 days, 200mg daily thereafter  - Continue Apixaban uninterrupted  - Continue treatment for C. Diff    Thank you for this consult. We will follow with you.    Ceasar Huitron MD  Clinical Cardiac Electrophysiology

## 2021-04-29 NOTE — H&P ADULT - PROBLEM SELECTOR PLAN 1
tele admit, cardio plans to do cardioversion in am, npo after mid night, amio drip as per cardio team. continue eliquis as per EP.

## 2021-04-29 NOTE — ED PROVIDER NOTE - CLINICAL SUMMARY MEDICAL DECISION MAKING FREE TEXT BOX
65F hx HTN, AFB w/RVR s/p albation 6 days ago present to ED with NB diarrhea and abdominal pain for 2 days.   Labs, CT abdomen, EKG, ordered.  Cardiology is on board. 65F hx HTN, AFB w/RVR s/p albation 6 days ago present to ED with NB diarrhea and abdominal pain for 2 days.   Labs, CT abdomen, EKG, ordered.  EP is on board.

## 2021-04-29 NOTE — H&P ADULT - NSICDXFAMILYHX_GEN_ALL_CORE_FT
FAMILY HISTORY:  Mother  Still living? Unknown  FH: atrial fibrillation, Age at diagnosis: Age Unknown

## 2021-04-29 NOTE — ED PROVIDER NOTE - PHYSICAL EXAMINATION
General: NAD, well appearing  HEENT: Normocephalic, EOM intact  Neck: No apparent stiffness or JVD  Pulm: Chest wall symmetric and nontender, lungs clear to ascultation   Cardiac: increased rate and irregular rhythm  Abdomen: Nontender and nondistended  Skin: Skin in warm, dry and intact without rashes or lesions.  Neuro: No apparent motor or sensory deficits  Psych: Alert, oriented, and cooperative General: NAD, well appearing  HEENT: Normocephalic, EOM intact  Neck: No apparent stiffness or JVD  Pulm: Chest wall symmetric and nontender, lungs clear to ascultation   Cardiac: increased rate and irregular rhythm  Abdomen: Nontender and nondistended  Skin: Skin in warm, dry and intact without rashes or lesions.  Neuro: No apparent motor or sensory deficits  Psych: Alert, oriented, and cooperative.

## 2021-04-29 NOTE — ED PROVIDER NOTE - NS ED ROS FT
General: No fever, no massive bleeding  Head: No HA, no ongoing scalp bleed  ENT: no neck pain, no sore throat  Resp: No SOB, no hemoptysis  Cardiovascular: No CP, No LOC  GI: + abdominal pain, No blood in stool  : No dysuria, no hematuria   MSK: No back pain, no limb pain  Skin: No painful or bleeding lesions  Neuro: No paresthesias, No focal deficits General: No fever, no massive bleeding  Head: No HA, no ongoing scalp bleed  ENT: no neck pain, no sore throat  Resp: No SOB, no hemoptysis  Cardiovascular: No CP, No LOC  GI: + abdominal pain, No blood in stool  : No dysuria, no hematuria   MSK: No back pain, no limb pain  Skin: No painful or bleeding lesions  Neuro: No paresthesias, No focal deficits.

## 2021-04-29 NOTE — ED PROVIDER NOTE - PROGRESS NOTE DETAILS
Patient remains in AFIB.  EP advises admission, NPO at midnight, Amio drip overnight, cardioversion planned in the AM. Patient positive for CDIFF, started on PO vanc.  Remains in Afib with RVR.  Amiodarone ordered by EP.

## 2021-04-29 NOTE — CONSULT NOTE ADULT - SUBJECTIVE AND OBJECTIVE BOX
[facility]                                                               CARDIAC ELECTROPHYSIOLOGY                                                       [facility] [place]                                                      Office: [address], [place] [address]                                                       Telephone: [phone]. Fax:[phone]    HPI:  Merle Rodarte is a 65 year old woman with HTN, HL, asthma and symptomatic persistent AF (on Apixaban, TRACY/CV in 2018) who is status post recent catheter ablation of AF on 4/23/21 (PVI, PWI, CTI line) who returns with significant watery diarrhea. She reports that she was doing well after her AF ablation but did not have a bowel movement for about 4 days. On Tuesday or so she developed significant loose stools. She reported going to the bathroom multiple times in a day with very loose stool. She noted a small amount of blood which she feels is related to skin tearing from the diarrhea. She started to have lightheadedness and so called the online cardiology line and was requested to come to the hospital. She denied having any fevers, chills, sweats, syncope, near syncope, dyspnea, peripheral edema, orthopnea, hematochezia or melena. In the ER, she was noted to be in AF with RVR. C. Diff testing returned positive. CT angio demonstrated no evidence of ischemic bowel.    PAST MEDICAL & SURGICAL HISTORY:  Atrial fibrillation, h/o [facility]V 2018  Asthma  HLD (hyperlipidemia)  HTN (hypertension)  History of tonsillectomy    REVIEW OF SYSTEMS: As per HPI. Remaining 10 point ROS were reviewed and are negative.    MEDICATIONS  (STANDING):  aMIOdarone    Tablet   Oral   aMIOdarone IVPB 150 milliGRAM(s) IV Intermittent once  vancomycin    Solution 125 milliGRAM(s) Oral every 6 hours    Allergies  penicillin (Hives)    SOCIAL HISTORY: Single, lives alone. Social EtOH. Former smoker.    FAMILY HISTORY:  No pertinent cardiac history.    Vital Signs Last 24 Hrs  T(C): 36.7 (29 Apr 2021 12:14), Max: 36.7 (29 Apr 2021 07:19)  T(F): 98 (29 Apr 2021 12:14), Max: 98.1 (29 Apr 2021 07:19)  HR: 114 (29 Apr 2021 12:14) (114 - 136)  BP: 145/74 (29 Apr 2021 12:14) (145/74 - 166/88)  BP(mean): --  RR: 16 (29 Apr 2021 12:14) (16 - 20)  SpO2: 99% (29 Apr 2021 12:14) (98% - 99%)    Physical Exam:  Appearance: Normal, well nourished	  HEENT: PERRL, EOMI, sclera non-icteric	  Cardiovascular: S1 S2, No JVD, Irregularly irregular, No cardiac murmurs, No carotid bruits, No peripheral edema  Respiratory: Lungs clear to auscultation, unlabored, no rhonchi/rales/wheezes  Psychiatry: A & O x 3, Mood & affect appropriate  Gastrointestinal:  Soft, Non-tender, + BS, no bruits	  Skin: No rashes, No ecchymoses, No cyanosis  Neurologic: Grossly non-focal with full strength in all four extremities  Extremities: Normal range of motion, No clubbing, cyanosis or edema    LABS:                        14.5   13.59 )-----------( 340      ( 29 Apr 2021 08:37 )             44.0   04-29    140  |  101  |  14.0  ----------------------------<  124<H>  3.5   |  23.0  |  0.69    Ca    9.8      29 Apr 2021 08:37  Mg     1.8     04-29    TPro  7.0  /  Alb  3.8  /  TBili  0.5  /  DBili  x   /  AST  17  /  ALT  20  /  AlkPhos  106  04-29  LIVER FUNCTIONS - ( 29 Apr 2021 08:37 )  Alb: 3.8 g/dL / Pro: 7.0 g/dL / ALK PHOS: 106 U/L / ALT: 20 U/L / AST: 17 U/L / GGT: x           CARDIAC MARKERS ( 29 Apr 2021 08:37 )  x     / 0.06 ng/mL / x     / x     / x        RADIOLOGY & ADDITIONAL STUDIES:  < from: CT Angio Abdomen and Pelvis w/ IV Cont (04.29.21 @ 10:34) >  IMPRESSION:  *  No acute pathology. No evidence of ischemic bowel or mesenteric branch vessel occlusion.

## 2021-04-29 NOTE — ED PROVIDER NOTE - OBJECTIVE STATEMENT
65F hx HTN, AFB w/RVR s/p albation 6 days ago present to ED with NB diarrhea and abdominal pain for 2 days.  Otherwise denies pain, bleeding, SOB, chest pain, or fevers.  Denies other pertinent medical problems.  Denies any overt substance use. 65F hx HTN, AFB w/RVR s/p ablation 6 days ago present to ED with NB diarrhea and abdominal pain for 2 days.  Otherwise denies pain, bleeding, SOB, chest pain, or fevers.  Denies other pertinent medical problems.  Denies any overt substance use.

## 2021-04-29 NOTE — H&P ADULT - HISTORY OF PRESENT ILLNESS
pt. is a 65 year old female with h/o HTN, HL, asthma and symptomatic persistent AF (on Apixaban, TRACY/CV in 2018) who is status post recent catheter ablation of AF on 4/23/21 who returns with significant watery diarrhea for past 2 day, multiple episodes. pt. reports that she was doing well after her AF ablation but did not have a bowel movement for about 4 days. As per pt. she was having lower abdomen cramping with diarrhea. no n/v. no fever. no recent antibiotic use. no sick contact. no recent travel. After 2 days of diarrhea pt. felt lightheaded. pt. called the online cardiology line and was requested to come to the hospital. There has been no cp, fevers, chills, syncope, near syncope, dyspnea, peripheral edema, orthopnea, hematochezia or melena. In the ER, she was noted to be in AF with RVR. C. Diff testing came back positive. CT angio abdomen showed : No acute pathology. No evidence of ischemic bowel or mesenteric branch vessel occlusion.

## 2021-04-29 NOTE — ED ADULT TRIAGE NOTE - CHIEF COMPLAINT QUOTE
Patient arrived to ED today with c/o diarrhea for the past 2 days that is painful.  Patient had cardiac ablation last week she states.

## 2021-04-29 NOTE — H&P ADULT - PROBLEM SELECTOR PLAN 2
pt. will be on contact precautions , po vancomycin, iv hydration. volume loss from diarrhea might have contributed to rapid afib trigger.

## 2021-04-29 NOTE — H&P ADULT - NSHPPHYSICALEXAM_GEN_ALL_CORE
General: Well developed female lying in bed not in distress.  HEENT: AT, NC. PERRL. intact EOM. no throat erythema or exudate.   Neck: supple. no JVD.   Chest: CTA bilaterally, no w /r/r.   Heart: S1,S2. IRRR. no heart murmur. No edema.   Abdomen: soft. non-tender. obese,+ BS.   Ext: no calf tenderness. ROM of all ext. intact. distal pulses 2 +.  Neuro: AAO x3. no focal weakness. no speech disorder, cns ii to xii intact. m /r/s intact.  Skin: warm and dy, no obvious rash noted. no pallor. no cyanosis.   Psych : pleasant, co-operative, no si/hi.

## 2021-04-29 NOTE — ED STATDOCS - PROGRESS NOTE DETAILS
64 y/o F pt with significant PMHx of Afib presents to the ED c/o s/p cardiac ablation for Afib last week. Pt notes that she went home Saturday and had no BM until Tuesday. She notes that Tuesday she began experiencing painful diarrhea. She notes 20 episodes of diarrhea yesterday with some blood noted however non today. Focused eval, protocol orders entered. Pt to be moved to main ED for further evaluation by another provider. 64 y/o F pt with significant PMHx of Afib presents to the ED c/o s/p cardiac ablation for Afib last week. Pt notes that she went home Saturday and had no BM until Tuesday. She notes that Tuesday she began experiencing painful diarrhea. She notes 20 episodes of diarrhea yesterday with some blood and mucus noted however no BM since 0530 today. Focused eval, protocol orders entered. Pt to be moved to main ED for further evaluation by another provider.  Endorsed to dr fraga.

## 2021-04-29 NOTE — H&P ADULT - NSICDXPASTMEDICALHX_GEN_ALL_CORE_FT
PAST MEDICAL HISTORY:  Asthma     [facility]    HLD (hyperlipidemia)     HTN (hypertension)

## 2021-04-29 NOTE — ED ADULT NURSE NOTE - OBJECTIVE STATEMENT
64yo female c/o diarrhea since tuesday night. pt had ablation last thurs, was told by md to come to ed for eval. pt denies vomiting, states eating soup and drinking fluids. painful diarrhea, denies abd pain, fever, chills, recent abx use. states diarrhea is thin and yellow denies foul odor

## 2021-04-30 ENCOUNTER — TRANSCRIPTION ENCOUNTER (OUTPATIENT)
Age: 66
End: 2021-04-30

## 2021-04-30 VITALS
RESPIRATION RATE: 18 BRPM | DIASTOLIC BLOOD PRESSURE: 86 MMHG | HEART RATE: 94 BPM | SYSTOLIC BLOOD PRESSURE: 185 MMHG | OXYGEN SATURATION: 98 %

## 2021-04-30 LAB
ANION GAP SERPL CALC-SCNC: 9 MMOL/L — SIGNIFICANT CHANGE UP (ref 5–17)
BASOPHILS # BLD AUTO: 0.03 K/UL — SIGNIFICANT CHANGE UP (ref 0–0.2)
BASOPHILS NFR BLD AUTO: 0.3 % — SIGNIFICANT CHANGE UP (ref 0–2)
BUN SERPL-MCNC: 8 MG/DL — SIGNIFICANT CHANGE UP (ref 8–20)
CALCIUM SERPL-MCNC: 8.9 MG/DL — SIGNIFICANT CHANGE UP (ref 8.6–10.2)
CHLORIDE SERPL-SCNC: 104 MMOL/L — SIGNIFICANT CHANGE UP (ref 98–107)
CO2 SERPL-SCNC: 27 MMOL/L — SIGNIFICANT CHANGE UP (ref 22–29)
COVID-19 SPIKE DOMAIN AB INTERP: POSITIVE
COVID-19 SPIKE DOMAIN ANTIBODY RESULT: 52.2 U/ML — HIGH
CREAT SERPL-MCNC: 0.66 MG/DL — SIGNIFICANT CHANGE UP (ref 0.5–1.3)
EOSINOPHIL # BLD AUTO: 0.29 K/UL — SIGNIFICANT CHANGE UP (ref 0–0.5)
EOSINOPHIL NFR BLD AUTO: 2.9 % — SIGNIFICANT CHANGE UP (ref 0–6)
GLUCOSE SERPL-MCNC: 111 MG/DL — HIGH (ref 70–99)
HCT VFR BLD CALC: 39.9 % — SIGNIFICANT CHANGE UP (ref 34.5–45)
HCV AB S/CO SERPL IA: 0.3 S/CO — SIGNIFICANT CHANGE UP (ref 0–0.99)
HCV AB SERPL-IMP: SIGNIFICANT CHANGE UP
HGB BLD-MCNC: 12.6 G/DL — SIGNIFICANT CHANGE UP (ref 11.5–15.5)
IMM GRANULOCYTES NFR BLD AUTO: 0.3 % — SIGNIFICANT CHANGE UP (ref 0–1.5)
LYMPHOCYTES # BLD AUTO: 1.48 K/UL — SIGNIFICANT CHANGE UP (ref 1–3.3)
LYMPHOCYTES # BLD AUTO: 15 % — SIGNIFICANT CHANGE UP (ref 13–44)
MAGNESIUM SERPL-MCNC: 1.9 MG/DL — SIGNIFICANT CHANGE UP (ref 1.6–2.6)
MCHC RBC-ENTMCNC: 29.2 PG — SIGNIFICANT CHANGE UP (ref 27–34)
MCHC RBC-ENTMCNC: 31.6 GM/DL — LOW (ref 32–36)
MCV RBC AUTO: 92.4 FL — SIGNIFICANT CHANGE UP (ref 80–100)
MONOCYTES # BLD AUTO: 0.91 K/UL — HIGH (ref 0–0.9)
MONOCYTES NFR BLD AUTO: 9.2 % — SIGNIFICANT CHANGE UP (ref 2–14)
NEUTROPHILS # BLD AUTO: 7.13 K/UL — SIGNIFICANT CHANGE UP (ref 1.8–7.4)
NEUTROPHILS NFR BLD AUTO: 72.3 % — SIGNIFICANT CHANGE UP (ref 43–77)
PLATELET # BLD AUTO: 309 K/UL — SIGNIFICANT CHANGE UP (ref 150–400)
POTASSIUM SERPL-MCNC: 3.2 MMOL/L — LOW (ref 3.5–5.3)
POTASSIUM SERPL-SCNC: 3.2 MMOL/L — LOW (ref 3.5–5.3)
RBC # BLD: 4.32 M/UL — SIGNIFICANT CHANGE UP (ref 3.8–5.2)
RBC # FLD: 13.3 % — SIGNIFICANT CHANGE UP (ref 10.3–14.5)
SARS-COV-2 IGG+IGM SERPL QL IA: 52.2 U/ML — HIGH
SARS-COV-2 IGG+IGM SERPL QL IA: POSITIVE
SODIUM SERPL-SCNC: 140 MMOL/L — SIGNIFICANT CHANGE UP (ref 135–145)
T3 SERPL-MCNC: 106 NG/DL — SIGNIFICANT CHANGE UP (ref 80–200)
T4 AB SER-ACNC: 9.2 UG/DL — SIGNIFICANT CHANGE UP (ref 4.5–12)
TSH SERPL-MCNC: 0.93 UIU/ML — SIGNIFICANT CHANGE UP (ref 0.27–4.2)
WBC # BLD: 9.87 K/UL — SIGNIFICANT CHANGE UP (ref 3.8–10.5)
WBC # FLD AUTO: 9.87 K/UL — SIGNIFICANT CHANGE UP (ref 3.8–10.5)

## 2021-04-30 PROCEDURE — 99239 HOSP IP/OBS DSCHRG MGMT >30: CPT

## 2021-04-30 PROCEDURE — 92960 CARDIOVERSION ELECTRIC EXT: CPT

## 2021-04-30 RX ORDER — SODIUM CHLORIDE 9 MG/ML
1000 INJECTION INTRAMUSCULAR; INTRAVENOUS; SUBCUTANEOUS ONCE
Refills: 0 | Status: COMPLETED | OUTPATIENT
Start: 2021-04-30 | End: 2021-04-30

## 2021-04-30 RX ORDER — DILTIAZEM HCL 120 MG
300 CAPSULE, EXT RELEASE 24 HR ORAL DAILY
Refills: 0 | Status: DISCONTINUED | OUTPATIENT
Start: 2021-04-30 | End: 2021-04-30

## 2021-04-30 RX ORDER — SODIUM CHLORIDE 9 MG/ML
1000 INJECTION, SOLUTION INTRAVENOUS
Refills: 0 | Status: DISCONTINUED | OUTPATIENT
Start: 2021-04-30 | End: 2021-04-30

## 2021-04-30 RX ORDER — PROPOFOL 10 MG/ML
50 INJECTION, EMULSION INTRAVENOUS ONCE
Refills: 0 | Status: COMPLETED | OUTPATIENT
Start: 2021-04-30 | End: 2021-04-30

## 2021-04-30 RX ORDER — AMIODARONE HYDROCHLORIDE 400 MG/1
2 TABLET ORAL
Qty: 120 | Refills: 0
Start: 2021-04-30 | End: 2021-05-29

## 2021-04-30 RX ORDER — METOPROLOL TARTRATE 50 MG
100 TABLET ORAL
Refills: 0 | Status: DISCONTINUED | OUTPATIENT
Start: 2021-04-30 | End: 2021-04-30

## 2021-04-30 RX ORDER — METOPROLOL TARTRATE 50 MG
100 TABLET ORAL DAILY
Refills: 0 | Status: DISCONTINUED | OUTPATIENT
Start: 2021-04-30 | End: 2021-04-30

## 2021-04-30 RX ORDER — METOPROLOL TARTRATE 50 MG
1 TABLET ORAL
Qty: 0 | Refills: 0 | DISCHARGE

## 2021-04-30 RX ORDER — VANCOMYCIN HCL 1 G
5 VIAL (EA) INTRAVENOUS
Qty: 280 | Refills: 0
Start: 2021-04-30 | End: 2021-05-13

## 2021-04-30 RX ORDER — PROPOFOL 10 MG/ML
30 INJECTION, EMULSION INTRAVENOUS ONCE
Refills: 0 | Status: COMPLETED | OUTPATIENT
Start: 2021-04-30 | End: 2021-04-30

## 2021-04-30 RX ORDER — POTASSIUM CHLORIDE 20 MEQ
40 PACKET (EA) ORAL ONCE
Refills: 0 | Status: COMPLETED | OUTPATIENT
Start: 2021-04-30 | End: 2021-04-30

## 2021-04-30 RX ADMIN — Medication 1 GRAM(S): at 12:50

## 2021-04-30 RX ADMIN — Medication 125 MILLIGRAM(S): at 12:50

## 2021-04-30 RX ADMIN — PANTOPRAZOLE SODIUM 40 MILLIGRAM(S): 20 TABLET, DELAYED RELEASE ORAL at 12:54

## 2021-04-30 RX ADMIN — AMIODARONE HYDROCHLORIDE 16.7 MG/MIN: 400 TABLET ORAL at 05:41

## 2021-04-30 RX ADMIN — AMIODARONE HYDROCHLORIDE 400 MILLIGRAM(S): 400 TABLET ORAL at 05:24

## 2021-04-30 RX ADMIN — PROPOFOL 30 MILLIGRAM(S): 10 INJECTION, EMULSION INTRAVENOUS at 08:06

## 2021-04-30 RX ADMIN — PROPOFOL 50 MILLIGRAM(S): 10 INJECTION, EMULSION INTRAVENOUS at 08:04

## 2021-04-30 RX ADMIN — PROPOFOL 50 MILLIGRAM(S): 10 INJECTION, EMULSION INTRAVENOUS at 08:10

## 2021-04-30 RX ADMIN — Medication 40 MILLIEQUIVALENT(S): at 10:34

## 2021-04-30 RX ADMIN — Medication 100 MILLIGRAM(S): at 08:43

## 2021-04-30 RX ADMIN — SODIUM CHLORIDE 1000 MILLILITER(S): 9 INJECTION INTRAMUSCULAR; INTRAVENOUS; SUBCUTANEOUS at 10:18

## 2021-04-30 RX ADMIN — PROPOFOL 30 MILLIGRAM(S): 10 INJECTION, EMULSION INTRAVENOUS at 08:08

## 2021-04-30 RX ADMIN — PANTOPRAZOLE SODIUM 40 MILLIGRAM(S): 20 TABLET, DELAYED RELEASE ORAL at 01:25

## 2021-04-30 RX ADMIN — Medication 300 MILLIGRAM(S): at 08:43

## 2021-04-30 RX ADMIN — APIXABAN 5 MILLIGRAM(S): 2.5 TABLET, FILM COATED ORAL at 05:24

## 2021-04-30 RX ADMIN — Medication 125 MILLIGRAM(S): at 05:24

## 2021-04-30 RX ADMIN — SODIUM CHLORIDE 100 MILLILITER(S): 9 INJECTION, SOLUTION INTRAVENOUS at 10:34

## 2021-04-30 NOTE — ED ADULT NURSE REASSESSMENT NOTE - NS ED NURSE REASSESS COMMENT FT1
Pt denies sob and chest pain.  states he will come to bedside. Pt resting on cm in rapid a fib. pt educated on plan of care, pt able to successfully teach back plan of care to RN, RN will continue to reeducate pt during hospital stay.

## 2021-04-30 NOTE — ED PROCEDURE NOTE - NS_POSTPROCCAREGUIDE_ED_ALL_ED
Patient is now fully awake, with vital signs and temperature stable, hydration is adequate, patients Khoa’s  score is at baseline (or greater than 8), patient and escort has received  discharge education.

## 2021-04-30 NOTE — ED ADULT NURSE REASSESSMENT NOTE - NS ED NURSE REASSESS COMMENT FT1
Assumed pt care at 0730.  Pt in rapid afib, -165, Dr. Huitron at bedside, pt cardioverted by Dr. Marte and Dr. Huitron with positive effect, will continue to monitor

## 2021-04-30 NOTE — PROGRESS NOTE ADULT - SUBJECTIVE AND OBJECTIVE BOX
Electrophysiology Attending Follow Up Note    Subjective: Patient states that her diarrhea is improving. This morning she had a less formed stool which was dark brown, but not black and without blood.    EKG: NSR with APCs at ~100 bpm.  TELE: AF --> NSR with APCs    MEDICATIONS  (STANDING):  aMIOdarone    Tablet   Oral   aMIOdarone    Tablet 400 milliGRAM(s) Oral every 12 hours  aMIOdarone Infusion 1 mG/Min (33.3 mL/Hr) IV Continuous <Continuous>  apixaban 5 milliGRAM(s) Oral every 12 hours  atorvastatin 20 milliGRAM(s) Oral at bedtime  budesonide 160 MICROgram(s)/formoterol 4.5 MICROgram(s) Inhaler 2 Puff(s) Inhalation two times a day  diltiazem    milliGRAM(s) Oral daily  metoprolol succinate  milliGRAM(s) Oral daily  pantoprazole    Tablet 40 milliGRAM(s) Oral two times a day  sodium chloride 0.9%. 1000 milliLiter(s) (125 mL/Hr) IV Continuous <Continuous>  sucralfate 1 Gram(s) Oral two times a day  vancomycin    Solution 125 milliGRAM(s) Oral every 6 hours    MEDICATIONS  (PRN):  ALBUTerol    90 MICROgram(s) HFA Inhaler 2 Puff(s) Inhalation every 6 hours PRN Shortness of Breath and/or Wheezing    Allergies  penicillin (Hives)    Vital Signs Last 24 Hrs  T(C): 36.8 (30 Apr 2021 04:10), Max: 36.8 (30 Apr 2021 04:10)  T(F): 98.3 (30 Apr 2021 04:10), Max: 98.3 (30 Apr 2021 04:10)  HR: 100 (30 Apr 2021 08:35) (97 - 154)  BP: 157/86 (30 Apr 2021 08:35) (129/67 - 188/93)  BP(mean): --  RR: 16 (30 Apr 2021 08:35) (15 - 20)  SpO2: 97% (30 Apr 2021 08:35) (97% - 100%)    Physical Exam:  Constitutional: NAD, AAOx3  Cardiovascular: +S1S2, RRR, no murmurs, rubs, gallops; low JVP  Pulmonary: CTA b/l, unlabored, no rhonchi, rales or wheezes  GI: soft NTND +BS  Extremities: no pedal edema  Neuro: non focal, BURKS x4    LABS:                        12.6   9.87  )-----------( 309      ( 30 Apr 2021 03:07 )             39.9     04-30    140  |  104  |  8.0  ----------------------------<  111<H>  3.2<L>   |  27.0  |  0.66    Ca    8.9      30 Apr 2021 03:07  Mg     1.9     04-30    TPro  7.0  /  Alb  3.8  /  TBili  0.5  /  DBili  x   /  AST  17  /  ALT  20  /  AlkPhos  106  04-29

## 2021-04-30 NOTE — DISCHARGE NOTE PROVIDER - CARE PROVIDER_API CALL
Ceasar Huitron)  Cardiology; Internal Medicine  [address] [place] [address], [place] [address]  Phone: [phone]  Fax: [phone]  Established Patient  Follow Up Time: 1 week

## 2021-04-30 NOTE — ED ADULT NURSE REASSESSMENT NOTE - NS ED NURSE REASSESS COMMENT FT1
Pt still tachycardic in a fib,  notified. Will continue rate of amiodarone drip as per . Pt denies sob, chest pain, pt resting on cm. Pt resp are even and unlabored, skin color ekaterina for race.

## 2021-04-30 NOTE — DISCHARGE NOTE PROVIDER - NSDCCPCAREPLAN_GEN_ALL_CORE_FT
PRINCIPAL DISCHARGE DIAGNOSIS  Diagnosis: Atrial fibrillation  Assessment and Plan of Treatment: - you underwent an other cardiovertion during this admission  - we started new medication for you Amiodaron, please take 400 mg twice a day for 14 days and then continue to take 200 mg daily unless instructed otherwise by your cardiologist   - continue to take Metoprolol and Cardizem and Eliquis  - please follow up with your cardiologist      SECONDARY DISCHARGE DIAGNOSES  Diagnosis: Clostridium difficile diarrhea  Assessment and Plan of Treatment: - plase finish course of Antibiotics: Vancomycin 125 mg 4 times a day for 14 days more  - keep yourself hydrated  - follow up with your primary medical conctor

## 2021-04-30 NOTE — DISCHARGE NOTE NURSING/CASE MANAGEMENT/SOCIAL WORK - PATIENT PORTAL LINK FT
You can access the FollowMyHealth Patient Portal offered by [facility] by registering at the following website: [email or web address]. By joining [facility]’s FollowMyHealth portal, you will also be able to view your health information using other applications (apps) compatible with our system.

## 2021-04-30 NOTE — PROGRESS NOTE ADULT - ASSESSMENT
Merle Rodarte is a 65 year old woman with HTN, HL, asthma and symptomatic persistent AF (on Apixaban, TRACY/CV in 2018) who is status post recent catheter ablation of AF on 4/23/21 (PVI, PWI, CTI line) who returns with significant watery diarrhea found to be C. Diff positive and also with AF with RVR. CTA demonstrated no evidence of ischemic bowel.    Her C. Diff has improved. She underwent cardioversion this morning with 200J of energy with successful restoration of normal rhythm. She is now in sinus rhythm ~100 bpm with APCs. She appears to be slightly dry on exam. Her borderline tachycardia may be due to relative hypovolemia, infection or in setting of missing PM dose of Metoprolol Succinate 100mg (she takes it BID) and Diltiazem 60mg (she takes 300mg in AM, 60mg in PM). She is hypertensive right now and CT does not show any evidence of pericardial effusion.    She should remain on Amiodarone 400mg BID to complete 14 day load (she can finish 24 hour IV load as well) after which she should be on 200mg daily. She can remain on her AVN blockers (Metoprolol Succinate 100mg BID & Diltiazem 300mg qAM, 60mg qPM). I will also give her a 1L IVF bolus.    From an EP perspective, she should be OK for discharge.    Atrial Fibrillation with RVR  S/p AF ablation  C. Diff Associated Diarrhea    Recommendations:  - PO Amiodarone 400mg BID x14 days, 200mg daily thereafter  - Continue Metoprolol Succinate 100mg BID  - Continue Diltiazem 300mg qAM, 60mg qPM  - 1L IVF bolus  - Continue Apixaban uninterrupted  - Continue treatment for C. Diff    OK for discharge from EP perspective. Discussed with Dr. Grullon who will evaluate to make sure patient is OK for discharge from a medicine perspective.    Ceasar Huitron MD  Clinical Cardiac Electrophysiology

## 2021-04-30 NOTE — DISCHARGE NOTE PROVIDER - NSDCMRMEDTOKEN_GEN_ALL_CORE_FT
Albuterol (Eqv-ProAir HFA) 90 mcg/inh inhalation aerosol: 2 puff(s) inhaled every 6 hours  atorvastatin 20 mg oral tablet: 1 tab(s) orally once a day  Cartia  mg/24 hours oral capsule, extended release: 1 cap(s) orally once a day  dilTIAZem 60 mg/12 hours oral capsule, extended release: 1 cap(s) orally every 12 hours  Eliquis 5 mg oral tablet: 1 tab(s) orally 2 times a day  furosemide 20 mg oral tablet: 1 tab(s) orally once a day x 3 days   Metoprolol Succinate  mg oral tablet, extended release: 1 tab(s) orally once a day  pantoprazole 40 mg oral delayed release tablet: 1 tab(s) orally 2 times a day x 14 days followed by 1 tabl orally daily x 30 days  sucralfate 1 g/10 mL oral suspension: 10 milliliter(s) orally 2 times a day x 14 days   Symbicort 160 mcg-4.5 mcg/inh inhalation aerosol: 2 puff(s) inhaled 2 times a day   Albuterol (Eqv-ProAir HFA) 90 mcg/inh inhalation aerosol: 2 puff(s) inhaled every 6 hours  amiodarone 200 mg oral tablet: 2 tab(s) orally 2 times a day for 14 days, and then 1 tab a day.   atorvastatin 20 mg oral tablet: 1 tab(s) orally once a day  Cartia  mg/24 hours oral capsule, extended release: 1 cap(s) orally once a day  dilTIAZem 60 mg/12 hours oral capsule, extended release: 1 cap(s) orally every 12 hours  Eliquis 5 mg oral tablet: 1 tab(s) orally 2 times a day  Metoprolol Succinate  mg oral tablet, extended release: 1 tab(s) orally 2 times a day  pantoprazole 40 mg oral delayed release tablet: 1 tab(s) orally 2 times a day x 14 days followed by 1 tabl orally daily x 30 days  sucralfate 1 g/10 mL oral suspension: 10 milliliter(s) orally 2 times a day x 14 days   Symbicort 160 mcg-4.5 mcg/inh inhalation aerosol: 2 puff(s) inhaled 2 times a day  vancomycin 25 mg/mL oral liquid: 5 milliliter(s) orally 4 times a day

## 2021-05-01 ENCOUNTER — TRANSCRIPTION ENCOUNTER (OUTPATIENT)
Age: 66
End: 2021-05-01

## 2021-05-02 LAB
CULTURE RESULTS: SIGNIFICANT CHANGE UP
SPECIMEN SOURCE: SIGNIFICANT CHANGE UP

## 2021-05-03 LAB
CULTURE RESULTS: SIGNIFICANT CHANGE UP
SPECIMEN SOURCE: SIGNIFICANT CHANGE UP

## 2021-05-11 ENCOUNTER — APPOINTMENT (OUTPATIENT)
Dept: ELECTROPHYSIOLOGY | Facility: CLINIC | Age: 66
End: 2021-05-11
Payer: MEDICARE

## 2021-05-11 ENCOUNTER — NON-APPOINTMENT (OUTPATIENT)
Age: 66
End: 2021-05-11

## 2021-05-11 VITALS
HEIGHT: 70 IN | OXYGEN SATURATION: 97 % | SYSTOLIC BLOOD PRESSURE: 158 MMHG | HEART RATE: 105 BPM | WEIGHT: 241 LBS | DIASTOLIC BLOOD PRESSURE: 98 MMHG | TEMPERATURE: 98.9 F | BODY MASS INDEX: 34.5 KG/M2

## 2021-05-11 DIAGNOSIS — Z86.19 PERSONAL HISTORY OF OTHER INFECTIOUS AND PARASITIC DISEASES: ICD-10-CM

## 2021-05-11 PROCEDURE — 93000 ELECTROCARDIOGRAM COMPLETE: CPT

## 2021-05-11 PROCEDURE — 99214 OFFICE O/P EST MOD 30 MIN: CPT

## 2021-05-11 RX ORDER — ALBUTEROL SULFATE 90 UG/1
108 (90 BASE) INHALANT RESPIRATORY (INHALATION) EVERY 6 HOURS
Qty: 1 | Refills: 0 | Status: ACTIVE | COMMUNITY
Start: 2021-03-30

## 2021-05-11 RX ORDER — DILTIAZEM HYDROCHLORIDE 60 MG/1
60 TABLET ORAL
Refills: 0 | Status: ACTIVE | COMMUNITY
Start: 2021-03-30

## 2021-05-11 NOTE — PHYSICAL EXAM
[Well Developed] : well developed [No Acute Distress] : no acute distress [Clear Lung Fields] : clear lung fields [No Respiratory Distress] : no respiratory distress  [Soft] : abdomen soft [No Edema] : no edema [Alert and Oriented] : alert and oriented

## 2021-05-12 NOTE — HISTORY OF PRESENT ILLNESS
[FreeTextEntry1] : 65 year old woman with HTN, HL, asthma and symptomatic persistent AF s/p recent catheter ablation of AF on 4/23/21 (PVI, PWI, CTI line) with Dr. Huitron who presents for post ablation follow-up.  \par \par Following ablation, patient returned to [facility] with watery diarrhea.  Found to be C. Diff positive and also in AF with RVR. CTA demonstrated no evidence of ischemic bowel. Improved with C Diff treatment and IV fluids.. She underwent successful cardioversion and started on Amiodarone load (400mg BID x 14 days, followed by 200mg daily).\par \par Presents today for post ablation follow-up.  EKG notable for Aflutter, V rate 109bpm.  Remains on Amiodarone 400mg BID x 2 more days.  Starts 200mg daily on Saturday 5/15/21.\par \par Has complaints of moderate fatigue for the past 2 days, which may indicate arrhythmia onset.  Prior to this, had been feeling well with improved energy.  Denies CP, SOB, dizziness, syncope or presyncope.  Denies bleeding complications on anticoagulation. GI symptoms have resolved. NO recent diarrhea, abdominal pain, N/V.  Remains on Vancomyin PO.  Course with be completed on 5/14

## 2021-05-12 NOTE — DISCUSSION/SUMMARY
[FreeTextEntry1] : 65 year old woman with HTN, HL, asthma and symptomatic persistent AF s/p recent catheter ablation of AF on 4/23/21 (PVI, PWI, CTI line) with Dr. Huitron who presents for post ablation follow-up.  Following ablation, patient returned to [facility] with C. Diff positive and found to be in AF with RVR. Improved with C antibiotics and IV fluids.. She underwent successful cardioversion and started on Amiodarone load (400mg BID x 14 days, followed by 200mg daily).\par \par Presents today for post ablation follow-up. EKG notable for Aflutter, V rate 109bpm.  Remains on Amiodarone 400mg BID x 2 more days.  Starts 200mg daily on Saturday 5/15/21. Has complaints of moderate fatigue for the past 2 days, which may indicate arrhythmia onset.  Prior to this, had been feeling well with improved energy.  Denies CP, SOB, dizziness, syncope or presyncope.  Denies bleeding complications on anticoagulation and reports strict compliance with a/c.\par \par - Early recurrent arrhythmia post ablation. Will complete Amiodarone load and arrange EP f/up next week at [facility].\par - If remains in persistent atrial arrhythmia, will arrange CV at [place]\par - Encouraged strict compliance with a/c.  Continue rate control\par - WIll complete Vancomycin treatment on 5/14. Although GI symptoms have resolved, encouraged hydration.\par \par f/up with Dr. Huitron next week at [facility] as discussed\par Brittany De Los Santos PAC\par \par

## 2021-05-12 NOTE — REVIEW OF SYSTEMS
[Feeling Fatigued] : feeling fatigued [Fever] : no fever [Chills] : no chills [SOB] : no shortness of breath [Dyspnea on exertion] : not dyspnea during exertion [Chest Discomfort] : no chest discomfort [Lower Ext Edema] : no extremity edema [Leg Claudication] : no intermittent leg claudication [Palpitations] : no palpitations [Syncope] : no syncope [Cough] : no cough [Abdominal Pain] : no abdominal pain [Nausea] : no nausea [Vomiting] : no vomiting [Diarrhea] : diarrhea [Dizziness] : no dizziness [Easy Bleeding] : no tendency for easy bleeding [Easy Bruising] : no tendency for easy bruising

## 2021-05-12 NOTE — END OF VISIT
[Time Spent: ___ minutes] : I have spent [unfilled] minutes of time on the encounter. [FreeTextEntry3] : I have personally seen, examined, and participated in the care of this patient. I have reviewed all pertinent clinical information, including history, physical exam, plan, and the PA/NP's note and agree except as noted below.\par \par Patient has had recurrent AF. She is still in the process of completing her amiodarone load. She will follow up at 3 office next week. IF she remains in AF, we will pursue repeat cardioversion at [facility]. If she has recurrent AF, then may consider repeat ablation 3 months after initial (after 7/23/21).\par \par Ceasar Huitron MD, FACC, [facility]\par Clinical Cardiac Electrophysiology

## 2021-05-17 PROCEDURE — 80048 BASIC METABOLIC PNL TOTAL CA: CPT

## 2021-05-17 PROCEDURE — 87077 CULTURE AEROBIC IDENTIFY: CPT

## 2021-05-17 PROCEDURE — 96374 THER/PROPH/DIAG INJ IV PUSH: CPT

## 2021-05-17 PROCEDURE — 93005 ELECTROCARDIOGRAM TRACING: CPT

## 2021-05-17 PROCEDURE — 92960 CARDIOVERSION ELECTRIC EXT: CPT

## 2021-05-17 PROCEDURE — U0005: CPT

## 2021-05-17 PROCEDURE — 84443 ASSAY THYROID STIM HORMONE: CPT

## 2021-05-17 PROCEDURE — 96375 TX/PRO/DX INJ NEW DRUG ADDON: CPT

## 2021-05-17 PROCEDURE — 84484 ASSAY OF TROPONIN QUANT: CPT

## 2021-05-17 PROCEDURE — 86803 HEPATITIS C AB TEST: CPT

## 2021-05-17 PROCEDURE — 87046 STOOL CULTR AEROBIC BACT EA: CPT

## 2021-05-17 PROCEDURE — 87507 IADNA-DNA/RNA PROBE TQ 12-25: CPT

## 2021-05-17 PROCEDURE — 87493 C DIFF AMPLIFIED PROBE: CPT

## 2021-05-17 PROCEDURE — 83735 ASSAY OF MAGNESIUM: CPT

## 2021-05-17 PROCEDURE — 87045 FECES CULTURE AEROBIC BACT: CPT

## 2021-05-17 PROCEDURE — 87177 OVA AND PARASITES SMEARS: CPT

## 2021-05-17 PROCEDURE — 80053 COMPREHEN METABOLIC PANEL: CPT

## 2021-05-17 PROCEDURE — 84436 ASSAY OF TOTAL THYROXINE: CPT

## 2021-05-17 PROCEDURE — U0003: CPT

## 2021-05-17 PROCEDURE — 85025 COMPLETE CBC W/AUTO DIFF WBC: CPT

## 2021-05-17 PROCEDURE — 36415 COLL VENOUS BLD VENIPUNCTURE: CPT

## 2021-05-17 PROCEDURE — 84480 ASSAY TRIIODOTHYRONINE (T3): CPT

## 2021-05-17 PROCEDURE — 86769 SARS-COV-2 COVID-19 ANTIBODY: CPT

## 2021-05-17 PROCEDURE — 83605 ASSAY OF LACTIC ACID: CPT

## 2021-05-17 PROCEDURE — 99285 EMERGENCY DEPT VISIT HI MDM: CPT | Mod: 25

## 2021-05-17 PROCEDURE — 94640 AIRWAY INHALATION TREATMENT: CPT

## 2021-05-17 PROCEDURE — 74174 CTA ABD&PLVS W/CONTRAST: CPT

## 2021-05-20 ENCOUNTER — APPOINTMENT (OUTPATIENT)
Dept: ELECTROPHYSIOLOGY | Facility: CLINIC | Age: 66
End: 2021-05-20

## 2021-06-22 ENCOUNTER — NON-APPOINTMENT (OUTPATIENT)
Age: 66
End: 2021-06-22

## 2021-06-22 ENCOUNTER — APPOINTMENT (OUTPATIENT)
Dept: ELECTROPHYSIOLOGY | Facility: CLINIC | Age: 66
End: 2021-06-22
Payer: MEDICARE

## 2021-06-22 VITALS
BODY MASS INDEX: 35.22 KG/M2 | HEIGHT: 70 IN | TEMPERATURE: 98.8 F | WEIGHT: 246 LBS | DIASTOLIC BLOOD PRESSURE: 98 MMHG | HEART RATE: 78 BPM | OXYGEN SATURATION: 96 % | SYSTOLIC BLOOD PRESSURE: 160 MMHG

## 2021-06-22 DIAGNOSIS — Z98.890 OTHER SPECIFIED POSTPROCEDURAL STATES: ICD-10-CM

## 2021-06-22 DIAGNOSIS — Z86.79 OTHER SPECIFIED POSTPROCEDURAL STATES: ICD-10-CM

## 2021-06-22 PROCEDURE — 93000 ELECTROCARDIOGRAM COMPLETE: CPT

## 2021-06-22 PROCEDURE — 99214 OFFICE O/P EST MOD 30 MIN: CPT

## 2021-06-22 RX ORDER — PANTOPRAZOLE 40 MG/1
40 TABLET, DELAYED RELEASE ORAL DAILY
Refills: 0 | Status: DISCONTINUED | COMMUNITY
End: 2021-06-22

## 2021-06-22 RX ORDER — AMIODARONE HYDROCHLORIDE 200 MG/1
200 TABLET ORAL
Refills: 0 | Status: DISCONTINUED | COMMUNITY
End: 2021-06-22

## 2021-06-22 RX ORDER — VANCOMYCIN HCL 900 MCG/MG
POWDER (GRAM) MISCELLANEOUS
Refills: 0 | Status: DISCONTINUED | COMMUNITY
End: 2021-06-22

## 2021-06-22 RX ORDER — SUCRALFATE 1 G/10ML
1 SUSPENSION ORAL
Refills: 0 | Status: DISCONTINUED | COMMUNITY
End: 2021-06-22

## 2021-06-22 NOTE — DISCUSSION/SUMMARY
[FreeTextEntry1] : 65 year old woman with HTN, HL, asthma and symptomatic persistent AF s/p recent catheter ablation of AF on 4/23/21 (PVI, PWI, CTI line) with Dr. Huitron who presents for post ablation follow-up. \par \par Following ablation, patient returned to [facility] with watery diarrhea. Found to be C. Diff positive and also in AF with RVR. CTA demonstrated no evidence of ischemic bowel. Improved with C Diff treatment and IV fluids.. She underwent successful cardioversion and started on Amiodarone load (400mg BID x 14 days, followed by 200mg daily).\par \par During post ablation follow up noted to be in Aflutter, V rate 109bpm. Remained on amiodarone and underwent successful cardioversion to SR on 5/25/21. \par \par Since cardioversion she reports that she has felt symptomatic improvement, feels back to baseline for the past week or two. ECG reveals maintained in SR. Tolerating Eliquis without complaints of easy bruising, or bleeding but does not dark urine at times. Has history of kidney stones- denies jin red blood in urine. \par \par Recommendation:\par \par -Maintained in SR on ECG today s/p repeat DCCV 5/25/21, s/p AF ablation 4/23/21. Feeling well, tolerating Eliquis without complaints of easy bruising, bleeding, or falls. Dark urine at times with history of kidney stones counseled to report any bleeding. To discontinue amiodarone today. Ep follow up to be arranged in 6 [address] [facility]. Urged her to contact us as needed with any symptomatic events prior. Continue toprol and diltiazem.\par \par Erum CANNON-C

## 2021-06-22 NOTE — HISTORY OF PRESENT ILLNESS
[FreeTextEntry1] : 65 year old woman with HTN, HL, asthma and symptomatic persistent AF s/p recent catheter ablation of AF on 4/23/21 (PVI, PWI, CTI line) with Dr. Huitron who presents for post ablation follow-up. \par \par Following ablation, patient returned to [facility] with watery diarrhea. Found to be C. Diff positive and also in AF with RVR. CTA demonstrated no evidence of ischemic bowel. Improved with C Diff treatment and IV fluids.. She underwent successful cardioversion and started on Amiodarone load (400mg BID x 14 days, followed by 200mg daily).\par \par During post ablation follow up noted to be in Aflutter, V rate 109bpm. Remained on amiodarone and underwent successful cardioversion to SR on 5/25/21. \par \par Since cardioversion she reports that she has felt symptomatic improvement, feels back to baseline for the past week or two. ECG reveals maintained in SR. Tolerating Eliquis without complaints of easy bruising, or bleeding but does not dark urine at times. Has history of kidney stones- denies jin red blood in urine. \par \par \par

## 2021-06-22 NOTE — PHYSICAL EXAM
[Well Developed] : well developed [Well Nourished] : well nourished [Normal S1, S2] : normal S1, S2 [No Murmur] : no murmur [Clear Lung Fields] : clear lung fields [Normal Gait] : normal gait [No Edema] : no edema [Moves all extremities] : moves all extremities

## 2021-06-22 NOTE — REVIEW OF SYSTEMS
[Headache] : no headache [Feeling Fatigued] : not feeling fatigued [SOB] : no shortness of breath [Dyspnea on exertion] : not dyspnea during exertion [Dizziness] : no dizziness [Easy Bleeding] : no tendency for easy bleeding

## 2021-12-14 ENCOUNTER — NON-APPOINTMENT (OUTPATIENT)
Age: 66
End: 2021-12-14

## 2021-12-14 ENCOUNTER — APPOINTMENT (OUTPATIENT)
Dept: ELECTROPHYSIOLOGY | Facility: CLINIC | Age: 66
End: 2021-12-14
Payer: MEDICARE

## 2021-12-14 VITALS
RESPIRATION RATE: 16 BRPM | HEART RATE: 86 BPM | TEMPERATURE: 98.1 F | BODY MASS INDEX: 35.5 KG/M2 | WEIGHT: 248 LBS | SYSTOLIC BLOOD PRESSURE: 195 MMHG | OXYGEN SATURATION: 97 % | HEIGHT: 70 IN | DIASTOLIC BLOOD PRESSURE: 82 MMHG

## 2021-12-14 DIAGNOSIS — I48.19 OTHER PERSISTENT ATRIAL FIBRILLATION: ICD-10-CM

## 2021-12-14 DIAGNOSIS — Z86.79 OTHER SPECIFIED POSTPROCEDURAL STATES: ICD-10-CM

## 2021-12-14 DIAGNOSIS — Z98.890 OTHER SPECIFIED POSTPROCEDURAL STATES: ICD-10-CM

## 2021-12-14 DIAGNOSIS — I10 ESSENTIAL (PRIMARY) HYPERTENSION: ICD-10-CM

## 2021-12-14 PROCEDURE — 93000 ELECTROCARDIOGRAM COMPLETE: CPT

## 2021-12-14 PROCEDURE — 99214 OFFICE O/P EST MOD 30 MIN: CPT

## 2021-12-14 RX ORDER — BUDESONIDE AND FORMOTEROL FUMARATE DIHYDRATE 160; 4.5 UG/1; UG/1
160-4.5 AEROSOL RESPIRATORY (INHALATION) TWICE DAILY
Refills: 0 | Status: ACTIVE | COMMUNITY
Start: 2021-03-30

## 2021-12-14 RX ORDER — ROSUVASTATIN CALCIUM 40 MG/1
40 TABLET, FILM COATED ORAL DAILY
Refills: 0 | Status: ACTIVE | COMMUNITY

## 2021-12-14 RX ORDER — ATORVASTATIN CALCIUM 20 MG/1
20 TABLET, FILM COATED ORAL
Qty: 30 | Refills: 0 | Status: DISCONTINUED | COMMUNITY
Start: 2021-03-30 | End: 2021-12-14

## 2021-12-14 RX ORDER — METOPROLOL SUCCINATE 100 MG/1
100 TABLET, EXTENDED RELEASE ORAL
Qty: 30 | Refills: 2 | Status: ACTIVE | COMMUNITY
Start: 2021-03-30

## 2021-12-14 NOTE — DISCUSSION/SUMMARY
[FreeTextEntry1] : 66 year old woman with HTN, HL, asthma and symptomatic persistent AF s/p recent catheter ablation of AF on 4/23/21 (PVI, PWI, CTI line) with Dr. Huitron who presents for post ablation follow-up. \par \par Following ablation, patient returned to [facility] with watery diarrhea. Found to be C. Diff positive and also in AF with RVR. CTA demonstrated no evidence of ischemic bowel. Improved with C Diff treatment and IV fluids.. She underwent successful cardioversion and started on Amiodarone load (400mg BID x 14 days, followed by 200mg daily).\par \par During post ablation follow up noted to be in Aflutter, V rate 109bpm. Remained on amiodarone and underwent successful cardioversion to SR on 5/25/21. \par \par Amiodarone was discontinued 6/22/21. Event monitor worn 8/25/21-9/7/21 revealed SR, no AF. \par \par Today, she reports she has been feeling well. Occasionally feels an extra beat sensation as if her AF were to start but then resolves. Tolerating eliquis without complaints of easy bruising, bleeding, or falls. Did have hematuria which was evaluated by PMD and subsequently resolved. BP elevated in office today 195/82, pt reports feeling anxious and having white coat syndrome as well as hypertension. \par \par Recommendation: \par \par -Ms. Rodarte is doing very well s/p ablation for atrial fibrillation 4/23/21. She initially had recurrence in the early post ablation period when she had C diff but has been maintained in SR on subsequent monitoring. She has not had any symptomatic recurrence since last follow up. To continue eliquis 5mg BID for stroke prophylaxis. To repeat 2 week MCOT prior to follow up 4/2022 1 year post ablation.\par -BP elevated in office today. She does not check BP at home. Initially slightly reluctant to start new medical therapy due to history of white coat hypertension. We discussed the importance of home BP monitoring in this setting and ordered patient home device. We also discussed that her BP has been elevated during previous f/u and previous admission but today uncontrolled at 195/82. To initiate amlodipine-olmesartan 5/20mg daily. Check BMP in 2 weeks and follow up with Dr. Herring in 3-4 weeks. \par \par Erum Anaya ANp-C

## 2021-12-14 NOTE — HISTORY OF PRESENT ILLNESS
[FreeTextEntry1] : 66 year old woman with HTN, HL, asthma and symptomatic persistent AF s/p recent catheter ablation of AF on 4/23/21 (PVI, PWI, CTI line) with Dr. Huitron who presents for post ablation follow-up. \par \par Following ablation, patient returned to [facility] with watery diarrhea. Found to be C. Diff positive and also in AF with RVR. CTA demonstrated no evidence of ischemic bowel. Improved with C Diff treatment and IV fluids.. She underwent successful cardioversion and started on Amiodarone load (400mg BID x 14 days, followed by 200mg daily).\par \par During post ablation follow up noted to be in Aflutter, V rate 109bpm. Remained on amiodarone and underwent successful cardioversion to SR on 5/25/21. \par \par Amiodarone was discontinued 6/22/21. Event monitor worn 8/25/21-9/7/21 revealed SR, no AF. \par \par Today, she reports she has been feeling well. Occasionally feels an extra beat sensation as if her AF were to start but then resolves. Tolerating eliquis without complaints of easy bruising, bleeding, or falls. Did have hematuria which was evaluated by PMD and subsequently resolved. BP elevated in office today 195/82, pt reports feeling anxious and having white coat syndrome as well as hypertension.

## 2021-12-14 NOTE — REVIEW OF SYSTEMS
[Headache] : no headache [Feeling Fatigued] : not feeling fatigued [SOB] : no shortness of breath [Dyspnea on exertion] : not dyspnea during exertion [Chest Discomfort] : no chest discomfort [Lower Ext Edema] : no extremity edema [Orthopnea] : no orthopnea [Syncope] : no syncope [Dizziness] : no dizziness [Easy Bleeding] : no tendency for easy bleeding

## 2021-12-14 NOTE — END OF VISIT
[FreeTextEntry3] : I have personally seen, examined, and participated in the care of this patient. I have reviewed all pertinent clinical information, including history, physical exam, plan, and the PA/NP's note and agree except as noted below.\par \par Continues to maintain sinus rhythm. Today she is quite hypertensive, though she feels this may be due to anxiety and white coat hypertension. We will start her on Amlodipine-Olmesartan to help with BP control. She will get labs in 2 weeks and follow up with Dr. Damon for BP management. Will follow up again in 3/2022 and will have patient wear a 2 week monitor beforehand.\par \par Ceasar Huitron MD, FACC, [facility]\par Clinical Cardiac Electrophysiology

## 2021-12-15 RX ORDER — AMLODIPINE AND OLMESARTAN MEDOXOMIL 5; 20 MG/1; MG/1
5-20 TABLET ORAL DAILY
Qty: 30 | Refills: 1 | Status: DISCONTINUED | COMMUNITY
Start: 2021-12-14 | End: 2021-12-15

## 2021-12-16 RX ORDER — AMLODIPINE BESYLATE 5 MG/1
5 TABLET ORAL
Qty: 90 | Refills: 0 | Status: DISCONTINUED | COMMUNITY
Start: 2021-12-15 | End: 2021-12-16

## 2021-12-16 RX ORDER — OLMESARTAN MEDOXOMIL 20 MG/1
20 TABLET, FILM COATED ORAL DAILY
Qty: 90 | Refills: 0 | Status: DISCONTINUED | COMMUNITY
Start: 2021-12-15 | End: 2021-12-16

## 2021-12-16 RX ORDER — HYDROCHLOROTHIAZIDE 25 MG/1
25 TABLET ORAL
Qty: 90 | Refills: 1 | Status: ACTIVE | COMMUNITY
Start: 2021-12-16 | End: 1900-01-01

## 2022-08-04 ENCOUNTER — RX RENEWAL (OUTPATIENT)
Age: 67
End: 2022-08-04

## 2023-05-08 NOTE — CONSULT NOTE ADULT - PROVIDER SPECIALTY LIST ADULT
Group Topic: BH Process Group     Date: 2023  Start Time:  9:00 AM  End Time: 10:00 AM  Facilitators: Mekhi Feldman LCPC; Brett Mcmillan LCSW    Focus: Pt reviews progress on daily/evening/weekend goal(s).  Pt learns to set achievable goals to address treatment needs in daily groups.     Pt verified name, , current location.  Pt gave verbal consent for telehealth treatment today, Brett Mcmillan LCSW, witnessed this.  Pt denies any changes or issues with medications.     60 min group provided via [employer].     Number in attendance: 12     Mood ratin/100   Physical feeling: healthy  Emotional feeling: very emotional  Accomplished evening/weekend goals: none  Program goal(s) for today:  acclimate to group  Evening goal(s): plug in hedger,       Method: Group  Attendance: Present  Participation: Active  Patient Response: Attentive  Mood: Anxious  Affect: Type: Anxious   Range: Full (normal)   Congruency: Congruent   Stability: Stable  Behavior/Socialization: Appropriate to group  Thought Process: Focused  Task Performance: Follows directions  Patient Evaluation: Independent - full participation         Electrophysiology

## 2024-01-27 NOTE — H&P PST ADULT - ADMIT DATE
Nonbillable note : patient says the pharmacy told her the order for clonazepam is not 30 tablets for her script , reviewed previous script . the script says 30 tablets that was sent yesterday, I sent the script again tonight . Patient notified to check with the pharmacy again .[facility]    23-Apr-2021